# Patient Record
Sex: FEMALE | Race: WHITE | NOT HISPANIC OR LATINO | URBAN - METROPOLITAN AREA
[De-identification: names, ages, dates, MRNs, and addresses within clinical notes are randomized per-mention and may not be internally consistent; named-entity substitution may affect disease eponyms.]

---

## 2020-03-27 ENCOUNTER — EMERGENCY (EMERGENCY)
Facility: HOSPITAL | Age: 45
LOS: 1 days | Discharge: HOME | End: 2020-03-27
Attending: EMERGENCY MEDICINE
Payer: COMMERCIAL

## 2020-03-27 VITALS
HEART RATE: 92 BPM | TEMPERATURE: 98 F | DIASTOLIC BLOOD PRESSURE: 72 MMHG | SYSTOLIC BLOOD PRESSURE: 124 MMHG | OXYGEN SATURATION: 100 % | RESPIRATION RATE: 16 BRPM

## 2020-03-27 VITALS
HEART RATE: 114 BPM | OXYGEN SATURATION: 100 % | RESPIRATION RATE: 20 BRPM | SYSTOLIC BLOOD PRESSURE: 171 MMHG | TEMPERATURE: 99 F | DIASTOLIC BLOOD PRESSURE: 86 MMHG | WEIGHT: 130.07 LBS | HEIGHT: 59.5 IN

## 2020-03-27 PROCEDURE — 71045 X-RAY EXAM CHEST 1 VIEW: CPT | Mod: 26

## 2020-03-27 PROCEDURE — 99284 EMERGENCY DEPT VISIT MOD MDM: CPT

## 2020-03-27 PROCEDURE — 99284 EMERGENCY DEPT VISIT MOD MDM: CPT | Mod: 25

## 2020-03-27 PROCEDURE — 94640 AIRWAY INHALATION TREATMENT: CPT

## 2020-03-27 PROCEDURE — 71045 X-RAY EXAM CHEST 1 VIEW: CPT

## 2020-03-27 RX ORDER — ALBUTEROL 90 UG/1
1 AEROSOL, METERED ORAL ONCE
Refills: 0 | Status: COMPLETED | OUTPATIENT
Start: 2020-03-27 | End: 2020-03-27

## 2020-03-27 RX ADMIN — ALBUTEROL 1 PUFF(S): 90 AEROSOL, METERED ORAL at 05:06

## 2020-03-27 NOTE — ED PROVIDER NOTE - PATIENT PORTAL LINK FT
You can access the FollowMyHealth Patient Portal offered by [facility] by registering at the following website: [email or web address]. By joining [employer]’s FollowMyHealth portal, you will also be able to view your health information using other applications (apps) compatible with our system.

## 2020-03-27 NOTE — ED PROVIDER NOTE - NS ED ROS FT
GENERAL: No fever, chills  EYES: no vision changes, no discharge.   HEENT: no difficulty swallowing or speaking   CARDIAC: no chest pain/pressure, + SOB, no lower ex edema  PULMONARY: no cough, + SOB  GI: no abdominal pain, n/v/d  : no dysuria  SKIN: no rashes  NEURO: no headache, lightheadedness.   MSK: No joint pain, myalgia, weakness.

## 2020-03-27 NOTE — ED PROVIDER NOTE - PHYSICAL EXAMINATION
General: Patient awake alert NAD, anxious affect.   HEENT: normocephalic, atraumatic, EOMI.    Cardiac: RRR, S1, S2, no murmur.   LUNGS: clear to auscultation b/l, no wheeze, rhonchi, speaking full sentences.     Abdomen: soft NT, ND, no rebound no guarding.   EXT: Moving all extremities, no edema.   Neuro: A&Ox3, no focal neurological deficits, CN 2-12 grossly intact  Skin: warm, dry, no rash.

## 2020-03-27 NOTE — ED ADULT NURSE NOTE - OBJECTIVE STATEMENT
45 year old female with a PMH of asthma comes to the ED c/o SOB x1day. Patient endorses taking albuterol inhaler with full relief. Patient denies fever/chills at home. Patient is a/ox3, VSS, ambulatory, speaking coherently, follows commands and in NAD at this time. Lung sounds are clear and equal b/l with no labored/respiratory distress noted. Patient denies CP/SOB, f/c, n/v/d, dizziness/lightheadedness, numbness/tingling/weakness, abdominal pain/back pain at this time.

## 2020-03-27 NOTE — ED PROVIDER NOTE - ATTENDING CONTRIBUTION TO CARE
Patient with asthma presenting complaining of one day of shortness of breath and dry cough.  Trying home asthma meds.  On exam lungs clear satting 100%.  Possible COVID-19.  CXR unremarkable.  Will discharge with return precautions.

## 2020-03-27 NOTE — ED PROVIDER NOTE - OBJECTIVE STATEMENT
44 yo F pmhs of asthma, pw SOB 1 day. symptoms better with albuterol inhaler. Pt afebrile at home. Pt denies CP, GI, , neuro symptoms. No other complaints. No recent travel, no contact with known covid patients.  Pt 100% on RA in the Ed.

## 2020-03-27 NOTE — ED ADULT TRIAGE NOTE - CHIEF COMPLAINT QUOTE
diff breathing  denies fevers  +COVID exposure diff breathing  denies fevers  +COVID exposure; pt was tested at urgent care today

## 2020-03-27 NOTE — ED PROVIDER NOTE - NSFOLLOWUPINSTRUCTIONS_ED_ALL_ED_FT
You were seen in the Emergency Department (ED) for difficulty breathing. Your oxygen saturation was 100% on room air.     Please follow up with your primary care doctor in the next 72 hours.    Please return to the ED if you experience any new or concerning symptoms, such as: chest pain, difficulty breathing, passing out, unable to eat or drink, fever, chills.     Thank you for visiting a [facility] ED.

## 2020-03-27 NOTE — ED PROVIDER NOTE - PSH
Back surgery 1988  Camejo marquis for scoliosis 1988  Benign Nevus of Skin (ICD9 216.9)    cholecystectomy 07/06/04

## 2020-03-27 NOTE — ED ADULT NURSE NOTE - NSIMPLEMENTINTERV_GEN_ALL_ED
Implemented All Universal Safety Interventions:  [place] to call system. Call bell, personal items and telephone within reach. Instruct patient to call for assistance. Room bathroom lighting operational. Non-slip footwear when patient is off stretcher. Physically safe environment: no spills, clutter or unnecessary equipment. Stretcher in lowest position, wheels locked, appropriate side rails in place.

## 2020-03-27 NOTE — ED PROVIDER NOTE - CLINICAL SUMMARY MEDICAL DECISION MAKING FREE TEXT BOX
46 yo F pmhs of asthma, pw SOB 1 day. symptoms better with albuterol inhaler. Likely mild asthma exacerbation in setting of viral illness. MDI, CXR. OK home.

## 2020-03-27 NOTE — ED PROVIDER NOTE - PROGRESS NOTE DETAILS
Brittany Key M.D. Resident  Xray clear on PACS. Shared decision-making with pt, patient agreeable to going home.

## 2020-03-27 NOTE — ED PROVIDER NOTE - PMH
Anemia (ICD9 285.9)    Asthma (ICD9 493.90)  Last asthma episode 10/04/09  Celiac Disease (ICD9 579.0)    PFO (patent foramen ovale)    Scoliosis (ICD9 737.30)    TIA (Transient Ischemic Attack) (ICD9 435.9)    Vitamin D Deficiency (ICD9 268.9)

## 2020-04-03 ENCOUNTER — EMERGENCY (EMERGENCY)
Facility: HOSPITAL | Age: 45
LOS: 1 days | Discharge: HOME | End: 2020-04-03
Attending: EMERGENCY MEDICINE
Payer: COMMERCIAL

## 2020-04-03 VITALS
TEMPERATURE: 99 F | WEIGHT: 130.07 LBS | RESPIRATION RATE: 20 BRPM | SYSTOLIC BLOOD PRESSURE: 136 MMHG | DIASTOLIC BLOOD PRESSURE: 78 MMHG | OXYGEN SATURATION: 99 % | HEIGHT: 59.5 IN | HEART RATE: 106 BPM

## 2020-04-03 VITALS
SYSTOLIC BLOOD PRESSURE: 139 MMHG | RESPIRATION RATE: 20 BRPM | TEMPERATURE: 99 F | DIASTOLIC BLOOD PRESSURE: 77 MMHG | HEART RATE: 94 BPM | OXYGEN SATURATION: 100 %

## 2020-04-03 LAB
ALBUMIN SERPL ELPH-MCNC: 4.3 G/DL — SIGNIFICANT CHANGE UP (ref 3.3–5)
ALP SERPL-CCNC: 95 U/L — SIGNIFICANT CHANGE UP (ref 40–120)
ALT FLD-CCNC: 69 U/L — HIGH (ref 10–45)
ANION GAP SERPL CALC-SCNC: 14 MMOL/L — SIGNIFICANT CHANGE UP (ref 5–17)
APTT BLD: 37.4 SEC — HIGH (ref 27.5–36.3)
AST SERPL-CCNC: 49 U/L — HIGH (ref 10–40)
BASOPHILS # BLD AUTO: 0.05 K/UL — SIGNIFICANT CHANGE UP (ref 0–0.2)
BASOPHILS NFR BLD AUTO: 0.5 % — SIGNIFICANT CHANGE UP (ref 0–2)
BILIRUB SERPL-MCNC: 0.2 MG/DL — SIGNIFICANT CHANGE UP (ref 0.2–1.2)
BUN SERPL-MCNC: 8 MG/DL — SIGNIFICANT CHANGE UP (ref 7–23)
CALCIUM SERPL-MCNC: 9.5 MG/DL — SIGNIFICANT CHANGE UP (ref 8.4–10.5)
CHLORIDE SERPL-SCNC: 100 MMOL/L — SIGNIFICANT CHANGE UP (ref 96–108)
CO2 SERPL-SCNC: 22 MMOL/L — SIGNIFICANT CHANGE UP (ref 22–31)
CREAT SERPL-MCNC: 0.45 MG/DL — LOW (ref 0.5–1.3)
EOSINOPHIL # BLD AUTO: 0.04 K/UL — SIGNIFICANT CHANGE UP (ref 0–0.5)
EOSINOPHIL NFR BLD AUTO: 0.4 % — SIGNIFICANT CHANGE UP (ref 0–6)
GLUCOSE SERPL-MCNC: 104 MG/DL — HIGH (ref 70–99)
HCT VFR BLD CALC: 39.5 % — SIGNIFICANT CHANGE UP (ref 34.5–45)
HGB BLD-MCNC: 13.7 G/DL — SIGNIFICANT CHANGE UP (ref 11.5–15.5)
IMM GRANULOCYTES NFR BLD AUTO: 0.2 % — SIGNIFICANT CHANGE UP (ref 0–1.5)
INR BLD: 1.26 RATIO — HIGH (ref 0.88–1.16)
LYMPHOCYTES # BLD AUTO: 1.61 K/UL — SIGNIFICANT CHANGE UP (ref 1–3.3)
LYMPHOCYTES # BLD AUTO: 17.2 % — SIGNIFICANT CHANGE UP (ref 13–44)
MCHC RBC-ENTMCNC: 30.5 PG — SIGNIFICANT CHANGE UP (ref 27–34)
MCHC RBC-ENTMCNC: 34.7 GM/DL — SIGNIFICANT CHANGE UP (ref 32–36)
MCV RBC AUTO: 88 FL — SIGNIFICANT CHANGE UP (ref 80–100)
MONOCYTES # BLD AUTO: 0.61 K/UL — SIGNIFICANT CHANGE UP (ref 0–0.9)
MONOCYTES NFR BLD AUTO: 6.5 % — SIGNIFICANT CHANGE UP (ref 2–14)
NEUTROPHILS # BLD AUTO: 7.02 K/UL — SIGNIFICANT CHANGE UP (ref 1.8–7.4)
NEUTROPHILS NFR BLD AUTO: 75.2 % — SIGNIFICANT CHANGE UP (ref 43–77)
NRBC # BLD: 0 /100 WBCS — SIGNIFICANT CHANGE UP (ref 0–0)
PLATELET # BLD AUTO: 411 K/UL — HIGH (ref 150–400)
POTASSIUM SERPL-MCNC: 4.1 MMOL/L — SIGNIFICANT CHANGE UP (ref 3.5–5.3)
POTASSIUM SERPL-SCNC: 4.1 MMOL/L — SIGNIFICANT CHANGE UP (ref 3.5–5.3)
PROT SERPL-MCNC: 8.4 G/DL — HIGH (ref 6–8.3)
PROTHROM AB SERPL-ACNC: 14.5 SEC — HIGH (ref 10–12.9)
RBC # BLD: 4.49 M/UL — SIGNIFICANT CHANGE UP (ref 3.8–5.2)
RBC # FLD: 12.2 % — SIGNIFICANT CHANGE UP (ref 10.3–14.5)
SODIUM SERPL-SCNC: 136 MMOL/L — SIGNIFICANT CHANGE UP (ref 135–145)
WBC # BLD: 9.35 K/UL — SIGNIFICANT CHANGE UP (ref 3.8–10.5)
WBC # FLD AUTO: 9.35 K/UL — SIGNIFICANT CHANGE UP (ref 3.8–10.5)

## 2020-04-03 PROCEDURE — 85027 COMPLETE CBC AUTOMATED: CPT

## 2020-04-03 PROCEDURE — 99284 EMERGENCY DEPT VISIT MOD MDM: CPT | Mod: 25

## 2020-04-03 PROCEDURE — 70498 CT ANGIOGRAPHY NECK: CPT

## 2020-04-03 PROCEDURE — 99285 EMERGENCY DEPT VISIT HI MDM: CPT

## 2020-04-03 PROCEDURE — 93005 ELECTROCARDIOGRAM TRACING: CPT

## 2020-04-03 PROCEDURE — 93010 ELECTROCARDIOGRAM REPORT: CPT

## 2020-04-03 PROCEDURE — 70450 CT HEAD/BRAIN W/O DYE: CPT

## 2020-04-03 PROCEDURE — 70496 CT ANGIOGRAPHY HEAD: CPT

## 2020-04-03 PROCEDURE — 71045 X-RAY EXAM CHEST 1 VIEW: CPT | Mod: 26

## 2020-04-03 PROCEDURE — 70496 CT ANGIOGRAPHY HEAD: CPT | Mod: 26

## 2020-04-03 PROCEDURE — 71045 X-RAY EXAM CHEST 1 VIEW: CPT

## 2020-04-03 PROCEDURE — 70498 CT ANGIOGRAPHY NECK: CPT | Mod: 26

## 2020-04-03 PROCEDURE — 85610 PROTHROMBIN TIME: CPT

## 2020-04-03 PROCEDURE — 85730 THROMBOPLASTIN TIME PARTIAL: CPT

## 2020-04-03 PROCEDURE — 80053 COMPREHEN METABOLIC PANEL: CPT

## 2020-04-03 PROCEDURE — 70450 CT HEAD/BRAIN W/O DYE: CPT | Mod: 26,59

## 2020-04-03 PROCEDURE — 82962 GLUCOSE BLOOD TEST: CPT

## 2020-04-03 RX ORDER — CLOPIDOGREL BISULFATE 75 MG/1
1 TABLET, FILM COATED ORAL
Qty: 30 | Refills: 0
Start: 2020-04-03 | End: 2020-05-02

## 2020-04-03 NOTE — ED PROVIDER NOTE - CLINICAL SUMMARY MEDICAL DECISION MAKING FREE TEXT BOX
45y F hx TIA, PFO, coagulopathy, asthma supposed to be on Plavix which she has not been taking here today with c/o L sided numbness onset 2AM. No focal exam findings. Will obtain CT head. D/w neuro. Jarrett LOTT home w oral AC given no deficits and OP FU. Regarding SOB, pt is satting 100%, no distress, lungs CTA BL, no fevers. Will check EKG, CXR, given SOB only occurs at night, possibly FER vs anxiety driven, Can give OP pulm FU.

## 2020-04-03 NOTE — ED PROVIDER NOTE - PATIENT PORTAL LINK FT
You can access the FollowMyHealth Patient Portal offered by [facility] by registering at the following website: [email or web address]. By joining [employer]’s FollowMyHealth portal, you will also be able to view your health information using other applications (apps) compatible with our system.

## 2020-04-03 NOTE — ED ADULT NURSE NOTE - OBJECTIVE STATEMENT
pt developed left sided weakness and is out of the window for tpa.  she can walk and has no neuro deficits.she has weakness in her left arm to her leg  pulses and refill are adequate pt developed left sided weakness and is out of the window for tpa.  she can walk and has no neuro deficits.she has weakness in her left arm to her leg  pulses and refill are adequate  pt has a history of TIA

## 2020-04-03 NOTE — ED PROVIDER NOTE - CARE PROVIDER_API CALL
Jose Mueller (DO)  Neurology; Vascular Neurology  [address]  [place] [address]  Phone: [phone]  Fax: [phone]  Follow Up Time: 1-3 Days

## 2020-04-03 NOTE — ED PROVIDER NOTE - PROGRESS NOTE DETAILS
Madhu, PGY1 - Spoke to neuro. Unclear what's going on with pt (small stroke vs. dymelinatiny lesion vs. migraine). Unable to perform MRI, so recommend c/w Plavix 75 qd and CTA head/neck. F/u with Dr. Salvador Oneal outpatient. Madhu, PGY1 - CTA normal. As per neuro recs, pt TBDC with Plavix and f/u with Dr. Mueller as outpatient. Discussed results and dispo with pt's father Dr. Wright (neurologist, [phone]). Madhu, PGY1 - CTA normal. As per neuro recs, pt TBDC with Plavix and f/u with Dr. Mueller as outpatient. Discussed results and dispo with pt's father Dr. Wright (neurologist, [phone]).    Results were discussed with patient as well as return precautions and follow up plan with PCP and/or specialist. Time was taken to answer any questions that the patient had before providing them with discharge paperwork.

## 2020-04-03 NOTE — ED PROVIDER NOTE - OBJECTIVE STATEMENT
45y F hx TIA, PFO, coagulopathy, asthma supposed to be on Plavix which she has not been taking here today with c/o L sided numbness. Pt states onset of sx at 2AM today. Was lying in bed when developed L sided neck pain which has since resolved, no fall/trauma or hx of disc herniation. Subsequently developed LUE and LLE subjective weakness. No slurred speech, no trouble ambulating. Took one dose of her mom's Xarelto this AM. Pt states had covid exposure and was tested 1 week ago and has been self -isolating since then. Received word that her test was negative today. States she has been feeling extremely anxious, endorses episodic SOB which occurs in the evening. Not presently sob. ALso endorsing tachycardia after taking her albuterol/ipatropium inhaler. 45y F hx TIA, PFO, coagulopathy, asthma supposed to be on Plavix which she has not been taking here today with c/o L sided numbness. Pt states onset of sx at 2AM today. Was lying in bed when developed L sided neck pain which has since resolved, no fall/trauma or hx of disc herniation. No spinal injections. Subsequently developed LUE and LLE subjective weakness. No slurred speech, no trouble ambulating. Took one dose of her mom's Xarelto this AM. Pt states had covid exposure and was tested 1 week ago and has been self -isolating since then. Received word that her test was negative today. States she has been feeling extremely anxious, endorses episodic SOB which occurs in the evening. Not presently sob. ALso endorsing tachycardia after taking her albuterol/ipatropium inhaler.

## 2020-04-03 NOTE — ED ADULT TRIAGE NOTE - CHIEF COMPLAINT QUOTE
left arm and left leg weakness left arm and left leg weakness since 2a, no facial droop or slurred speech, equal  bilaterally and strong

## 2020-04-03 NOTE — CONSULT NOTE ADULT - SUBJECTIVE AND OBJECTIVE BOX
NEUROLOGY CONSULT   HPI: 45y R-handed F with h/o self-reported TIA? (2008 L. hemiparesis lasting weeks), PFO, heterozygous prothrombin gene mutation, abnormal protein C response, central cervical disc protrusion C4/C5 and scoliosis s/p Camejo marquis placement p/w subjective left sided sensorimotor deficit of arm and leg.      ROS: A 10-system ROS was performed and is negative except for those items noted above.     PMH:  PFO (patent foramen ovale)  Scoliosis (ICD9 737.30)  TIA (Transient Ischemic Attack) (ICD9 435.9)  Vitamin D Deficiency (ICD9 268.9)  Celiac Disease (ICD9 579.0)  Anemia (ICD9 285.9)  Asthma (ICD9 493.90)    Home Medications:  Singulair:  orally  (10 Jul 2016 23:51)    ALLERGIES: Advil (Anaphylaxis)  aspirin (Hypotension)  codeine (Anaphylaxis)  opioid-like analgesics (Anaphylaxis)    PSH:   Benign Nevus of Skin (ICD9 216.9)  Back surgery 1988  cholecystectomy 07/06/04    Social History: Denies tobacco, alcohol or drug use.     FH:  Lupus  Stroke in cousin      OBJECTIVE  Vital Signs Last 24 Hrs  T(C): 36.7 (03 Apr 2020 15:47), Max: 37.3 (03 Apr 2020 14:06)  T(F): 98 (03 Apr 2020 15:47), Max: 99.2 (03 Apr 2020 14:06)  HR: 92 (03 Apr 2020 18:00) (91 - 106)  BP: 132/85 (03 Apr 2020 18:00) (132/71 - 138/80)  BP(mean): --  RR: 20 (03 Apr 2020 18:00) (20 - 20)  SpO2: 99% (03 Apr 2020 18:00) (99% - 99%)  I&O's Summary    PHYSICAL EXAM:  General: Appears stated age, in NAD  Neurologic:  Mental Status: Awake, alert, oriented to person, place, situation, time. Normal affect. Follows all commands.  Language: Speech is clear, fluent with preserved naming, repetition and comprehension.    Cranial Nerves: PERRLA (R = 3mm, L = 3mm). Visual fields intact. EOMI no nystagmus. V1-3 intact to light touch.  No facial asymmetry b/l, full eye closure strength b/l. Hearing grossly normal to conversation.  Symmetric palate elevation in midline.  Head turning & shoulder shrug intact b/l. Tongue midline, normal movements, no atrophy.  Motor: Normal muscle bulk & tone. No noticeable tremor, myoclonus or pronator drift. 5/5 strength throughout.	  Sensation: Symmetric B/L preserved sensation to light touch, pin prick, position.    Cortical: No extinction on double simultaneous touch and no signs of neglect.   Reflexes: 3/4 throughout B/L UE and LE.  Plantar Responses are downgoing B/L    Coordination: Intact rapid-alternating movements. No dysmetria on finger-to-nose or heel-to-shin.  No dysdiadochokinesia  Gait: Normal stance, stride length, touch off, arm swing and may.   Normal Romberg. No postural instability.   Psychiatric: Normal mood and congruent affect.     CBC:                        13.7   9.35  )-----------( 411      ( 03 Apr 2020 16:15 )             39.5       CMP:  04-03    136  |  100  |  8   ----------------------------<  104<H>  4.1   |  22  |  0.45<L>    Ca    9.5      03 Apr 2020 16:15    TPro  8.4<H>  /  Alb  4.3  /  TBili  0.2  /  DBili  x   /  AST  49<H>  /  ALT  69<H>  /  AlkPhos  95  04-03    LIVER FUNCTIONS - ( 03 Apr 2020 16:15 )  Alb: 4.3 g/dL / Pro: 8.4 g/dL / ALK PHOS: 95 U/L / ALT: 69 U/L / AST: 49 U/L / GGT: x           PT/INR - ( 03 Apr 2020 16:15 )   PT: 14.5 sec;   INR: 1.26 ratio      CT Head No Cont: 04/03/2020    IMPRESSION: No acute intracranial findings. NEUROLOGY CONSULT   HPI: 45y R-handed F with h/o self-reported TIA? (2008 L. hemiparesis lasting weeks), PFO, heterozygous prothrombin gene mutation, abnormal protein C response, central cervical disc protrusion C4/C5 and scoliosis s/p Camejo marquis placement p/w subjective left sided sensorimotor deficit of arm and leg.  LKN 04/02/2020 16:00.  Began to experience L. hand and foot numbness which she attributed to poor circulation.  Woke up at 02:00 am with L. neck pain and numbness persistent.  The numbness improved but then turned into left sided weakness.  She notes this has also improved but reports she still feels her leg is heavy.  NIHSS: 0.     She reports same episode in 2008 though at that time it involved the lower portion of her left cheek.  Prior work up included CT head, CTA H&N which did not definitively show ischemic infarct.  She additionally had a TTE which showed + PFO and hypercoagulable work up which was positive for protein C abnormality, heterozygous prothrombin gene mutation but negative for factor 5 Leiden  She was told to continue Plavix indefinitely but stopped on her own accord as she felt normal.  Her left sided symptoms had improved over several weeks.  Of note, prior outpatient records note a R. hemiparesis.  She has no residual deficits from prior episode.  She cannot take aspirin as she becomes hypotensive from ASA?  Additionally, cannot get MRI as she has MRI-incompatible Camejo marquis for scoliosis.  Denies any trauma, headache though in past endorses scotomas in her visual fields a/w headache.    ROS: A 10-system ROS was performed and is negative except for those items noted above.     PMH:  PFO (patent foramen ovale)  Scoliosis (ICD9 737.30)  TIA (Transient Ischemic Attack) (ICD9 435.9)  Vitamin D Deficiency (ICD9 268.9)  Celiac Disease (ICD9 579.0)  Anemia (ICD9 285.9)  Asthma (ICD9 493.90)    Home Medications:  Singulair:  orally  (10 Jul 2016 23:51)    ALLERGIES: Advil (Anaphylaxis)  aspirin (Hypotension)  codeine (Anaphylaxis)  opioid-like analgesics (Anaphylaxis)    PSH:   Benign Nevus of Skin (ICD9 216.9)  Back surgery 1988  cholecystectomy 07/06/04    Social History: Denies tobacco, alcohol or drug use.     FH:  Lupus  Stroke in cousin      OBJECTIVE  Vital Signs Last 24 Hrs  T(C): 36.7 (03 Apr 2020 15:47), Max: 37.3 (03 Apr 2020 14:06)  T(F): 98 (03 Apr 2020 15:47), Max: 99.2 (03 Apr 2020 14:06)  HR: 92 (03 Apr 2020 18:00) (91 - 106)  BP: 132/85 (03 Apr 2020 18:00) (132/71 - 138/80)  BP(mean): --  RR: 20 (03 Apr 2020 18:00) (20 - 20)  SpO2: 99% (03 Apr 2020 18:00) (99% - 99%)  I&O's Summary    PHYSICAL EXAM:  General: Appears stated age, in NAD  Neurologic:  Mental Status: Awake, alert, oriented to person, place, situation, time. Normal affect. Follows all commands.  Language: Speech is clear, fluent with preserved naming, repetition and comprehension.    Cranial Nerves: PERRLA (R = 3mm, L = 3mm). Visual fields intact. EOMI no nystagmus. V1-3 intact to light touch.  No facial asymmetry b/l, full eye closure strength b/l. Hearing grossly normal to conversation.  Symmetric palate elevation in midline.  Head turning & shoulder shrug intact b/l. Tongue midline, normal movements, no atrophy.  Motor: Normal muscle bulk & tone. No noticeable tremor, myoclonus or pronator drift. 5/5 strength throughout.	  Sensation: Symmetric B/L preserved sensation to light touch, pin prick, position.    Cortical: No extinction on double simultaneous touch and no signs of neglect.   Reflexes: 3/4 throughout B/L UE and LE.  Plantar Responses are downgoing B/L    Coordination: Intact rapid-alternating movements. No dysmetria on finger-to-nose or heel-to-shin.  No dysdiadochokinesia  Gait: Normal stance, stride length, touch off, arm swing and may.   Normal Romberg. No postural instability.   Psychiatric: Normal mood and congruent affect.     CBC:                        13.7   9.35  )-----------( 411      ( 03 Apr 2020 16:15 )             39.5       CMP:  04-03    136  |  100  |  8   ----------------------------<  104<H>  4.1   |  22  |  0.45<L>    Ca    9.5      03 Apr 2020 16:15    TPro  8.4<H>  /  Alb  4.3  /  TBili  0.2  /  DBili  x   /  AST  49<H>  /  ALT  69<H>  /  AlkPhos  95  04-03  PT/INR - ( 03 Apr 2020 16:15 )   PT: 14.5 sec;   INR: 1.26 ratio      CT Head No Cont: 04/03/2020    IMPRESSION: No acute intracranial findings.    CT Angio Neck w/ IV Cont (04.03.20 @ 19:07)   IMPRESSION:  No evidence for significant intracranial or extracranial arterial stenosis. If the patient remains symptomatic, consider brain MRI imaging follow-up.

## 2020-04-03 NOTE — CONSULT NOTE ADULT - ASSESSMENT
INCOMPLETE  Assessment:  45y R-handed F with h/o self-reported TIA? (2008 L. hemiparesis lasting weeks), PFO, heterozygous prothrombin gene mutation, abnormal protein C response, central cervical disc protrusion C4/C5 and scoliosis s/p harington marquis placement p/w subjective left sided sensorimotor deficit of arm and leg.      LKN: 16:00 04/02/2020  NIHSS: 0  Baseline MRS: 0  Not a tPA or thrombectomy candidate due to NIHSS 0  On exam, she has no objective neurologic deficit.     Prior TIA extensive work up including hypercoagulable evaluation, TTE with bubble, CTH, CTA.  Cannot get MRI due to non-compatible Harington marquis.  PT cannot take aspirin due to hypotensive adverse reaction?  Was intended to take Plavix indefinitely after prior episode, but self discontinued as she felt fine.     IMPRESSION: S/O L sensory-motor deficit, objectively improved, possibly due to R. brain dysfunction of unknown etiology but possibly ischemic vs. demyelinating vs. acephalgic complex migraine.     Plan  -Restart Plavix 75 daily, continue indefinitely and encourage adherence   -CTA H&N  -If normal, outpatient work up including repeat CT head w/o contrast, TTE, lipid panel, A1C  -Outpatient F/U with Dr. Mueller   -If LDL abnormal, atorvastatin 80 with adjustment to goal LDL <70    Assessment and plan discussed with the attending, Dr. Libman David Klein, DO  PGY-2 Neurology Service Assessment:  45y R-handed F with h/o self-reported TIA? (2008 L. hemiparesis lasting weeks), PFO, heterozygous prothrombin gene mutation, abnormal protein C response, central cervical disc protrusion C4/C5 and scoliosis s/p harington marquis placement p/w subjective left sided sensorimotor deficit of arm and leg.      LKN: 16:00 04/02/2020  NIHSS: 0  Baseline MRS: 0  Not a tPA or thrombectomy candidate due to NIHSS 0  On exam, she has no objective neurologic deficit.     Prior TIA extensive work up including hypercoagulable evaluation, TTE with bubble, CTH, CTA.  Cannot get MRI due to non-compatible Harington marquis.  PT cannot take aspirin due to hypotensive adverse reaction?  Was intended to take Plavix indefinitely after prior episode, but self discontinued as she felt fine.  CT head and CTA H&N 04/03 unremarkable.     IMPRESSION: S/O L sensory-motor deficit, objectively improved, possibly due to R. brain dysfunction of unknown etiology but possibly ischemic vs. demyelinating vs. acephalgic complex migraine.     Plan  -Restart Plavix 75 daily, continue indefinitely and encourage adherence   -If normal, outpatient work up including repeat CT head w/o contrast, TTE, lipid panel, A1C  -Outpatient F/U with Dr. Mueller   -If LDL abnormal, atorvastatin 80 with adjustment to goal LDL <70    Assessment and plan discussed with the attending, Dr. Libman David Klein, DO  PGY-2 Neurology Service

## 2020-04-03 NOTE — ED PROVIDER NOTE - NSFOLLOWUPINSTRUCTIONS_ED_ALL_ED_FT
A prescription for Plavix was sent to your pharmacy. Take as directed on medication insert.     Follow up with Dr. Mueller (neurology) this week.    Rest, drink plenty of fluids.  Advance activity as tolerated.  Continue all previously prescribed medications as directed.  Follow up with your primary care physician in 48-72 hours- bring copies of your results.  Return to the ER for worsening or persistent symptoms, and/or ANY NEW OR CONCERNING SYMPTOMS. If you have issues obtaining follow up, please call: [phone] to obtain a doctor or specialist who takes your insurance in your area.

## 2020-04-03 NOTE — ED PROVIDER NOTE - PHYSICAL EXAMINATION
Gen: WNWD NAD  HEENT: NCAT PERRL EOMI normal pharynx  Neck: supple, no midline ttp   CV: RRR, no murmur  Lung: CTA BL  Abd: +BS soft NTND  Ext: wwp, palp pulses, FROMx4, no cce  Neuro: A&Ox3, CN grossly intact, sensation intact, motor 5/5 throughout, no drift, ambulatory

## 2020-04-03 NOTE — ED ADULT NURSE NOTE - CHIEF COMPLAINT QUOTE
left arm and left leg weakness since 2a, no facial droop or slurred speech, equal  bilaterally and strong

## 2020-04-20 ENCOUNTER — EMERGENCY (EMERGENCY)
Facility: HOSPITAL | Age: 45
LOS: 1 days | Discharge: HOME | End: 2020-04-20
Attending: EMERGENCY MEDICINE
Payer: COMMERCIAL

## 2020-04-20 VITALS
HEART RATE: 110 BPM | OXYGEN SATURATION: 99 % | DIASTOLIC BLOOD PRESSURE: 90 MMHG | SYSTOLIC BLOOD PRESSURE: 139 MMHG | TEMPERATURE: 98 F | WEIGHT: 130.07 LBS | RESPIRATION RATE: 17 BRPM | HEIGHT: 59 IN

## 2020-04-20 VITALS
DIASTOLIC BLOOD PRESSURE: 92 MMHG | SYSTOLIC BLOOD PRESSURE: 146 MMHG | HEART RATE: 94 BPM | TEMPERATURE: 98 F | OXYGEN SATURATION: 100 % | RESPIRATION RATE: 16 BRPM

## 2020-04-20 LAB
ALBUMIN SERPL ELPH-MCNC: 4.4 G/DL — SIGNIFICANT CHANGE UP (ref 3.3–5)
ALP SERPL-CCNC: 82 U/L — SIGNIFICANT CHANGE UP (ref 40–120)
ALT FLD-CCNC: 31 U/L — SIGNIFICANT CHANGE UP (ref 10–45)
ANION GAP SERPL CALC-SCNC: 14 MMOL/L — SIGNIFICANT CHANGE UP (ref 5–17)
APTT BLD: 32.6 SEC — SIGNIFICANT CHANGE UP (ref 27.5–36.3)
AST SERPL-CCNC: 25 U/L — SIGNIFICANT CHANGE UP (ref 10–40)
BASOPHILS # BLD AUTO: 0.03 K/UL — SIGNIFICANT CHANGE UP (ref 0–0.2)
BASOPHILS NFR BLD AUTO: 0.3 % — SIGNIFICANT CHANGE UP (ref 0–2)
BILIRUB SERPL-MCNC: 0.3 MG/DL — SIGNIFICANT CHANGE UP (ref 0.2–1.2)
BUN SERPL-MCNC: 8 MG/DL — SIGNIFICANT CHANGE UP (ref 7–23)
CALCIUM SERPL-MCNC: 10.1 MG/DL — SIGNIFICANT CHANGE UP (ref 8.4–10.5)
CHLORIDE SERPL-SCNC: 102 MMOL/L — SIGNIFICANT CHANGE UP (ref 96–108)
CO2 SERPL-SCNC: 22 MMOL/L — SIGNIFICANT CHANGE UP (ref 22–31)
CREAT SERPL-MCNC: 0.61 MG/DL — SIGNIFICANT CHANGE UP (ref 0.5–1.3)
CRP SERPL-MCNC: 0.91 MG/DL — HIGH (ref 0–0.4)
D DIMER BLD IA.RAPID-MCNC: <150 NG/ML DDU — SIGNIFICANT CHANGE UP
EOSINOPHIL # BLD AUTO: 0.04 K/UL — SIGNIFICANT CHANGE UP (ref 0–0.5)
EOSINOPHIL NFR BLD AUTO: 0.4 % — SIGNIFICANT CHANGE UP (ref 0–6)
FERRITIN SERPL-MCNC: 92 NG/ML — SIGNIFICANT CHANGE UP (ref 15–150)
GLUCOSE SERPL-MCNC: 98 MG/DL — SIGNIFICANT CHANGE UP (ref 70–99)
HCT VFR BLD CALC: 38.9 % — SIGNIFICANT CHANGE UP (ref 34.5–45)
HGB BLD-MCNC: 13.1 G/DL — SIGNIFICANT CHANGE UP (ref 11.5–15.5)
IMM GRANULOCYTES NFR BLD AUTO: 0.3 % — SIGNIFICANT CHANGE UP (ref 0–1.5)
INR BLD: 1.05 RATIO — SIGNIFICANT CHANGE UP (ref 0.88–1.16)
LDH SERPL L TO P-CCNC: 144 U/L — SIGNIFICANT CHANGE UP (ref 50–242)
LYMPHOCYTES # BLD AUTO: 1.4 K/UL — SIGNIFICANT CHANGE UP (ref 1–3.3)
LYMPHOCYTES # BLD AUTO: 15.3 % — SIGNIFICANT CHANGE UP (ref 13–44)
MCHC RBC-ENTMCNC: 30 PG — SIGNIFICANT CHANGE UP (ref 27–34)
MCHC RBC-ENTMCNC: 33.7 GM/DL — SIGNIFICANT CHANGE UP (ref 32–36)
MCV RBC AUTO: 89.2 FL — SIGNIFICANT CHANGE UP (ref 80–100)
MONOCYTES # BLD AUTO: 0.51 K/UL — SIGNIFICANT CHANGE UP (ref 0–0.9)
MONOCYTES NFR BLD AUTO: 5.6 % — SIGNIFICANT CHANGE UP (ref 2–14)
NEUTROPHILS # BLD AUTO: 7.12 K/UL — SIGNIFICANT CHANGE UP (ref 1.8–7.4)
NEUTROPHILS NFR BLD AUTO: 78.1 % — HIGH (ref 43–77)
NRBC # BLD: 0 /100 WBCS — SIGNIFICANT CHANGE UP (ref 0–0)
PLATELET # BLD AUTO: 382 K/UL — SIGNIFICANT CHANGE UP (ref 150–400)
POTASSIUM SERPL-MCNC: 3.7 MMOL/L — SIGNIFICANT CHANGE UP (ref 3.5–5.3)
POTASSIUM SERPL-SCNC: 3.7 MMOL/L — SIGNIFICANT CHANGE UP (ref 3.5–5.3)
PROCALCITONIN SERPL-MCNC: 0.02 NG/ML — SIGNIFICANT CHANGE UP (ref 0.02–0.1)
PROT SERPL-MCNC: 8.2 G/DL — SIGNIFICANT CHANGE UP (ref 6–8.3)
PROTHROM AB SERPL-ACNC: 12.1 SEC — SIGNIFICANT CHANGE UP (ref 10–12.9)
RBC # BLD: 4.36 M/UL — SIGNIFICANT CHANGE UP (ref 3.8–5.2)
RBC # FLD: 12.6 % — SIGNIFICANT CHANGE UP (ref 10.3–14.5)
SARS-COV-2 RNA SPEC QL NAA+PROBE: SIGNIFICANT CHANGE UP
SODIUM SERPL-SCNC: 138 MMOL/L — SIGNIFICANT CHANGE UP (ref 135–145)
TROPONIN T, HIGH SENSITIVITY RESULT: 11 NG/L — SIGNIFICANT CHANGE UP (ref 0–51)
WBC # BLD: 9.13 K/UL — SIGNIFICANT CHANGE UP (ref 3.8–10.5)
WBC # FLD AUTO: 9.13 K/UL — SIGNIFICANT CHANGE UP (ref 3.8–10.5)

## 2020-04-20 PROCEDURE — 85730 THROMBOPLASTIN TIME PARTIAL: CPT

## 2020-04-20 PROCEDURE — 71275 CT ANGIOGRAPHY CHEST: CPT

## 2020-04-20 PROCEDURE — 36415 COLL VENOUS BLD VENIPUNCTURE: CPT

## 2020-04-20 PROCEDURE — 83615 LACTATE (LD) (LDH) ENZYME: CPT

## 2020-04-20 PROCEDURE — 71045 X-RAY EXAM CHEST 1 VIEW: CPT

## 2020-04-20 PROCEDURE — 85027 COMPLETE CBC AUTOMATED: CPT

## 2020-04-20 PROCEDURE — 84484 ASSAY OF TROPONIN QUANT: CPT

## 2020-04-20 PROCEDURE — 71045 X-RAY EXAM CHEST 1 VIEW: CPT | Mod: 26

## 2020-04-20 PROCEDURE — 99285 EMERGENCY DEPT VISIT HI MDM: CPT | Mod: CR

## 2020-04-20 PROCEDURE — 80053 COMPREHEN METABOLIC PANEL: CPT

## 2020-04-20 PROCEDURE — 99284 EMERGENCY DEPT VISIT MOD MDM: CPT | Mod: 25

## 2020-04-20 PROCEDURE — 86140 C-REACTIVE PROTEIN: CPT

## 2020-04-20 PROCEDURE — 85379 FIBRIN DEGRADATION QUANT: CPT

## 2020-04-20 PROCEDURE — 71275 CT ANGIOGRAPHY CHEST: CPT | Mod: 26

## 2020-04-20 PROCEDURE — 83880 ASSAY OF NATRIURETIC PEPTIDE: CPT

## 2020-04-20 PROCEDURE — 82728 ASSAY OF FERRITIN: CPT

## 2020-04-20 PROCEDURE — 87635 SARS-COV-2 COVID-19 AMP PRB: CPT

## 2020-04-20 PROCEDURE — 84145 PROCALCITONIN (PCT): CPT

## 2020-04-20 PROCEDURE — 85610 PROTHROMBIN TIME: CPT

## 2020-04-20 PROCEDURE — 93010 ELECTROCARDIOGRAM REPORT: CPT

## 2020-04-20 PROCEDURE — 84702 CHORIONIC GONADOTROPIN TEST: CPT

## 2020-04-20 PROCEDURE — 93005 ELECTROCARDIOGRAM TRACING: CPT

## 2020-04-20 NOTE — ED PROVIDER NOTE - PATIENT PORTAL LINK FT
You can access the FollowMyHealth Patient Portal offered by [facility] by registering at the following website: [email or web address]. By joining [employer]’s FollowMyHealth portal, you will also be able to view your health information using other applications (apps) compatible with our system.

## 2020-04-20 NOTE — ED ADULT NURSE NOTE - OBJECTIVE STATEMENT
45 female  comes in with hx of asthma and TIA last week put on Plavix.  Pt symptoms were  left sided weakness no residual but Pt feels weakness time to time.  pt pox 100 % pr afebrile heartrate 100-106 and no respiratory symptoms  noted.  IVL placed and labs sent as ordered rolo hernandez

## 2020-04-20 NOTE — ED ADULT NURSE NOTE - CHPI ED NUR SYMPTOMS NEG
no shortness of breath/no vomiting/no congestion/no dizziness/no back pain/no chest pain/no chills/no nausea/no fever/no syncope/no diaphoresis

## 2020-04-20 NOTE — ED PROVIDER NOTE - NS ED ROS FT
Constitutional: no fevers, no chills.  Eyes: no visual changes.  Ears: no ear drainage, no ear pain.  Nose: no nasal congestion.  Mouth/Throat: no sore throat.  Cardiovascular: no chest pain.  Respiratory: +shortness of breath, no wheezing, no cough  Gastrointestinal: no nausea, no vomiting, + diarrhea, no abdominal pain.  MSK: no flank pain, no back pain.  Genitourinary: no dysuria, no hematuria.  Skin: no rashes.  Neuro: no headache

## 2020-04-20 NOTE — ED PROVIDER NOTE - OBJECTIVE STATEMENT
45y F hx TIA, PFO, coagulopathy, asthma supposed to be on Plavix, tested neg outpt presents with SOB and tachycardia x couple weeks unimproved by her nebulizer so sent by pulmonologist from [facility] to r/o PE. Pt also endorse some diarrhea, non bloody this AM. 100% on RA. Patient denies chest pain, f/c, cough, abd pain, N/V/C, weakness, HA, dizziness, urinary symptoms, extremity pain or swelling or other complaints.

## 2020-04-20 NOTE — ED PROVIDER NOTE - ATTENDING CONTRIBUTION TO CARE
Nemes - 44yo F hx TIA, PFO, coagulopathy, asthma supposed to be on Plavix, tested neg for Covid outpt presents with SOB and tachycardia x couple weeks unimproved by her nebulizer so sent by pulmonologist from [facility] to r/o PE. Pt also endorse some diarrhea, non bloody this AM. 100% on RA. Patient denies chest pain, f/c, cough, abd pain, N/V/C, weakness, HA, dizziness, urinary symptoms, extremity pain or swelling or other complaints. VS wnl, well appearing, in NAD, lungs clear, cardiac wnl, no JVD. Moist mucosae, pink conjunctivae. Abdomen soft/NT, no CVAT. No pedal edema, no calf TTP. Neuro grossly intact. Poss Covid vs PE. Will get labs, swab, CXR, if neg, will get CTA r/o PE

## 2020-04-20 NOTE — ED PROVIDER NOTE - PHYSICAL EXAMINATION
----- Message from Justine Lord sent at 11/29/2018  2:43 PM CST -----  Contact: Patient  Patient is returning a call, please call them back at [phone]. Thank you   GEN: Well appearing, well nourished, in no apparent distress.  HEAD: NCAT  HEENT: PERRL, Airway patent, EOMI, non-erythematous pharynx, no exudates, uvula midline, MMM, neck supple, no LAD, no JVD  LUNG: CTAB, no adventitious sounds, no retractions, no nasal flaring  CV: RRR, no murmurs,   Abd: soft, NTND, no rebound or guarding, BS+ in all quadrants, no CVAT  MSK: WWP, Pulses 2+ in extremities, No edema   Neuro:  AAOx3, Ambulatory with stable gait.  Skin: Warm and dry, no evidence of rash  Psych: normal mood and affect

## 2020-04-20 NOTE — ED PROVIDER NOTE - CLINICAL SUMMARY MEDICAL DECISION MAKING FREE TEXT BOX
45y F hx TIA, PFO, coagulopathy, asthma supposed to be on Plavix, tested neg outpt presents with SOB and tachycardia x couple weeks unimproved by her nebulizer so sent by pulmonologist from [facility] to r/o PE. Pt likely has COVID illness, get covid labs( Ferritin, D-dimer, CRP, Trop, VBG, basic labs, Coags), cxr and attempt to prone then titrate down O2 supplementation. Try albuterol and tylenol for symptom relief +/- abx. CTA to r/o PE as her CXR completely clear .

## 2020-04-20 NOTE — ED PROVIDER NOTE - PROGRESS NOTE DETAILS
Dr Hayden: labs unremarkble and CTPE negative, pt does not have current sob, ambulatory sat 98%. Pt has personal pulse ox which has been reading 08% of higher whole time she has been here

## 2020-04-20 NOTE — ED PROVIDER NOTE - NSFOLLOWUPINSTRUCTIONS_ED_ALL_ED_FT
1) You were seen in the ER for cough and fevers.      2) You might also have coronavirus so continue to isolate yourself and wear mask for 7 days until no longer have symptoms. YOU WILL BE CALLED WHEN THE RESULTS COME BACK. Call your PMD in order to clear you for work if needed.      3) Please take Tylenol 650mg every 4-6 hours as needed for pain.    4) Please return to ED for any new or worsening symptoms especially if you are having a lot of trouble breathing or can not speak in full sentence.     5) If your pulse oximeter reads less than 90% consistently, come back the ED.

## 2020-04-22 ENCOUNTER — TRANSCRIPTION ENCOUNTER (OUTPATIENT)
Age: 45
End: 2020-04-22

## 2020-05-26 ENCOUNTER — APPOINTMENT (OUTPATIENT)
Dept: CT IMAGING | Facility: CLINIC | Age: 45
End: 2020-05-26
Payer: COMMERCIAL

## 2020-05-26 ENCOUNTER — OUTPATIENT (OUTPATIENT)
Dept: OUTPATIENT SERVICES | Facility: HOSPITAL | Age: 45
LOS: 1 days | End: 2020-05-26
Payer: COMMERCIAL

## 2020-05-26 DIAGNOSIS — Z00.8 ENCOUNTER FOR OTHER GENERAL EXAMINATION: ICD-10-CM

## 2020-05-26 PROCEDURE — 70450 CT HEAD/BRAIN W/O DYE: CPT | Mod: 26

## 2020-05-26 PROCEDURE — 70450 CT HEAD/BRAIN W/O DYE: CPT

## 2021-02-27 ENCOUNTER — EMERGENCY (EMERGENCY)
Facility: HOSPITAL | Age: 46
LOS: 1 days | Discharge: HOME | End: 2021-02-27
Attending: EMERGENCY MEDICINE
Payer: COMMERCIAL

## 2021-02-27 VITALS
OXYGEN SATURATION: 100 % | HEART RATE: 78 BPM | TEMPERATURE: 98 F | RESPIRATION RATE: 16 BRPM | SYSTOLIC BLOOD PRESSURE: 132 MMHG | DIASTOLIC BLOOD PRESSURE: 72 MMHG

## 2021-02-27 VITALS
OXYGEN SATURATION: 98 % | RESPIRATION RATE: 16 BRPM | WEIGHT: 138.89 LBS | DIASTOLIC BLOOD PRESSURE: 72 MMHG | HEIGHT: 59 IN | TEMPERATURE: 98 F | HEART RATE: 82 BPM | SYSTOLIC BLOOD PRESSURE: 118 MMHG

## 2021-02-27 LAB
ALBUMIN SERPL ELPH-MCNC: 4 G/DL — SIGNIFICANT CHANGE UP (ref 3.3–5)
ALP SERPL-CCNC: 79 U/L — SIGNIFICANT CHANGE UP (ref 40–120)
ALT FLD-CCNC: 24 U/L — SIGNIFICANT CHANGE UP (ref 10–45)
ANION GAP SERPL CALC-SCNC: 11 MMOL/L — SIGNIFICANT CHANGE UP (ref 5–17)
APTT BLD: 32.3 SEC — SIGNIFICANT CHANGE UP (ref 27.5–35.5)
AST SERPL-CCNC: 22 U/L — SIGNIFICANT CHANGE UP (ref 10–40)
BASOPHILS # BLD AUTO: 0.02 K/UL — SIGNIFICANT CHANGE UP (ref 0–0.2)
BASOPHILS NFR BLD AUTO: 0.3 % — SIGNIFICANT CHANGE UP (ref 0–2)
BILIRUB SERPL-MCNC: 0.2 MG/DL — SIGNIFICANT CHANGE UP (ref 0.2–1.2)
BUN SERPL-MCNC: 11 MG/DL — SIGNIFICANT CHANGE UP (ref 7–23)
CALCIUM SERPL-MCNC: 9.3 MG/DL — SIGNIFICANT CHANGE UP (ref 8.4–10.5)
CHLORIDE SERPL-SCNC: 103 MMOL/L — SIGNIFICANT CHANGE UP (ref 96–108)
CO2 SERPL-SCNC: 25 MMOL/L — SIGNIFICANT CHANGE UP (ref 22–31)
CREAT SERPL-MCNC: 0.54 MG/DL — SIGNIFICANT CHANGE UP (ref 0.5–1.3)
EOSINOPHIL # BLD AUTO: 0.08 K/UL — SIGNIFICANT CHANGE UP (ref 0–0.5)
EOSINOPHIL NFR BLD AUTO: 1.1 % — SIGNIFICANT CHANGE UP (ref 0–6)
GLUCOSE SERPL-MCNC: 88 MG/DL — SIGNIFICANT CHANGE UP (ref 70–99)
HCT VFR BLD CALC: 37.4 % — SIGNIFICANT CHANGE UP (ref 34.5–45)
HGB BLD-MCNC: 12.4 G/DL — SIGNIFICANT CHANGE UP (ref 11.5–15.5)
IMM GRANULOCYTES NFR BLD AUTO: 0.3 % — SIGNIFICANT CHANGE UP (ref 0–1.5)
INR BLD: 1.11 RATIO — SIGNIFICANT CHANGE UP (ref 0.88–1.16)
LYMPHOCYTES # BLD AUTO: 1.65 K/UL — SIGNIFICANT CHANGE UP (ref 1–3.3)
LYMPHOCYTES # BLD AUTO: 22.6 % — SIGNIFICANT CHANGE UP (ref 13–44)
MAGNESIUM SERPL-MCNC: 2.4 MG/DL — SIGNIFICANT CHANGE UP (ref 1.6–2.6)
MCHC RBC-ENTMCNC: 30 PG — SIGNIFICANT CHANGE UP (ref 27–34)
MCHC RBC-ENTMCNC: 33.2 GM/DL — SIGNIFICANT CHANGE UP (ref 32–36)
MCV RBC AUTO: 90.6 FL — SIGNIFICANT CHANGE UP (ref 80–100)
MONOCYTES # BLD AUTO: 0.32 K/UL — SIGNIFICANT CHANGE UP (ref 0–0.9)
MONOCYTES NFR BLD AUTO: 4.4 % — SIGNIFICANT CHANGE UP (ref 2–14)
NEUTROPHILS # BLD AUTO: 5.22 K/UL — SIGNIFICANT CHANGE UP (ref 1.8–7.4)
NEUTROPHILS NFR BLD AUTO: 71.3 % — SIGNIFICANT CHANGE UP (ref 43–77)
NRBC # BLD: 0 /100 WBCS — SIGNIFICANT CHANGE UP (ref 0–0)
PHOSPHATE SERPL-MCNC: 2.8 MG/DL — SIGNIFICANT CHANGE UP (ref 2.5–4.5)
PLATELET # BLD AUTO: 350 K/UL — SIGNIFICANT CHANGE UP (ref 150–400)
POTASSIUM SERPL-MCNC: 3.9 MMOL/L — SIGNIFICANT CHANGE UP (ref 3.5–5.3)
POTASSIUM SERPL-SCNC: 3.9 MMOL/L — SIGNIFICANT CHANGE UP (ref 3.5–5.3)
PROT SERPL-MCNC: 7.3 G/DL — SIGNIFICANT CHANGE UP (ref 6–8.3)
PROTHROM AB SERPL-ACNC: 13.3 SEC — SIGNIFICANT CHANGE UP (ref 10.6–13.6)
RBC # BLD: 4.13 M/UL — SIGNIFICANT CHANGE UP (ref 3.8–5.2)
RBC # FLD: 13.7 % — SIGNIFICANT CHANGE UP (ref 10.3–14.5)
SODIUM SERPL-SCNC: 139 MMOL/L — SIGNIFICANT CHANGE UP (ref 135–145)
TROPONIN T, HIGH SENSITIVITY RESULT: 10 NG/L — SIGNIFICANT CHANGE UP (ref 0–51)
TROPONIN T, HIGH SENSITIVITY RESULT: 13 NG/L — SIGNIFICANT CHANGE UP (ref 0–51)
WBC # BLD: 7.31 K/UL — SIGNIFICANT CHANGE UP (ref 3.8–10.5)
WBC # FLD AUTO: 7.31 K/UL — SIGNIFICANT CHANGE UP (ref 3.8–10.5)

## 2021-02-27 PROCEDURE — 71046 X-RAY EXAM CHEST 2 VIEWS: CPT | Mod: 26

## 2021-02-27 PROCEDURE — 85610 PROTHROMBIN TIME: CPT

## 2021-02-27 PROCEDURE — 71046 X-RAY EXAM CHEST 2 VIEWS: CPT

## 2021-02-27 PROCEDURE — 93005 ELECTROCARDIOGRAM TRACING: CPT

## 2021-02-27 PROCEDURE — 83735 ASSAY OF MAGNESIUM: CPT

## 2021-02-27 PROCEDURE — 85730 THROMBOPLASTIN TIME PARTIAL: CPT

## 2021-02-27 PROCEDURE — 84484 ASSAY OF TROPONIN QUANT: CPT

## 2021-02-27 PROCEDURE — 93010 ELECTROCARDIOGRAM REPORT: CPT

## 2021-02-27 PROCEDURE — 85025 COMPLETE CBC W/AUTO DIFF WBC: CPT

## 2021-02-27 PROCEDURE — 85379 FIBRIN DEGRADATION QUANT: CPT

## 2021-02-27 PROCEDURE — 99285 EMERGENCY DEPT VISIT HI MDM: CPT

## 2021-02-27 PROCEDURE — 80053 COMPREHEN METABOLIC PANEL: CPT

## 2021-02-27 PROCEDURE — 84100 ASSAY OF PHOSPHORUS: CPT

## 2021-02-27 PROCEDURE — 96374 THER/PROPH/DIAG INJ IV PUSH: CPT

## 2021-02-27 PROCEDURE — 99284 EMERGENCY DEPT VISIT MOD MDM: CPT | Mod: 25

## 2021-02-27 PROCEDURE — 84702 CHORIONIC GONADOTROPIN TEST: CPT

## 2021-02-27 RX ORDER — FAMOTIDINE 10 MG/ML
20 INJECTION INTRAVENOUS ONCE
Refills: 0 | Status: COMPLETED | OUTPATIENT
Start: 2021-02-27 | End: 2021-02-27

## 2021-02-27 RX ADMIN — FAMOTIDINE 20 MILLIGRAM(S): 10 INJECTION INTRAVENOUS at 16:35

## 2021-02-27 RX ADMIN — Medication 30 MILLILITER(S): at 16:35

## 2021-02-27 NOTE — ED PROVIDER NOTE - PROGRESS NOTE DETAILS
Rogers Palacios, PGY2: Patient seen and evaluated. States pain resolved. Has good PCP and cardiology for follow up. Discussed return precautions and all questions answered. Pt in agreement w/ plan. CAOx3, NAD, VSS. Stable for d/c. Rogers Palacios, PGY2: Patient seen and evaluated. States pain resolved. Has good PCP and cardiology for follow up. Discussed return precautions and all questions answered. Pt in agreement w/ plan. CAOx3, NAD, VSS. Stable for d/c.  Attending Statement: Agree with the above.  No actionable findings on w/u in ED.  No CP recurrence in ED.  Will d/c c strict return precautions and f/u c her cardiologist as above.  --BMM

## 2021-02-27 NOTE — ED ADULT TRIAGE NOTE - CHIEF COMPLAINT QUOTE
3 episodes of chest pain today  sent from urgent care, chest pain resolved while at urgent care  #20 R AC by EMS

## 2021-02-27 NOTE — ED ADULT NURSE NOTE - OBJECTIVE STATEMENT
45y Female with PMH of asthma and celiac disease presents to the ED c/o chest pain. Pt states this morning she felt a sharp pain 3x in the center of her chest. Not currently experiencing any pain. Pt reports she walked to  due to her concerns about the chest pain and was then referred to the ED for further testing. Pt AOx3, spontaneous/unlabored respirations, speaking in full sentences. Placed on cardiac monitor. Pt resting in bed, side rails up, bed in lowest position. Seen and evaluated by MD.

## 2021-02-27 NOTE — ED PROVIDER NOTE - OBJECTIVE STATEMENT
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 Chest pain has resolved since but still feels mild "heartburn" feeling. Denies dyspnea, h/o DVt/PE/stents, leg swelling, OCP use, fever, abdominal pain, N/V/D.

## 2021-02-27 NOTE — ED PROVIDER NOTE - PATIENT PORTAL LINK FT
You can access the FollowMyHealth Patient Portal offered by [facility] by registering at the following website: [email or web address]. By joining [employer]s FollowMyHealth portal, you will also be able to view your health information using other applications (apps) compatible with our system.

## 2021-02-27 NOTE — ED ADULT TRIAGE NOTE - BP NONINVASIVE SYSTOLIC (MM HG)
Problem: Nutrition  Goal: Tolerates feedings  2021 0549 by Grace Rojas RN  Outcome: Outcome Met, Continue evaluating goal progress toward completion  2021 0543 by Grace Rojas RN  Outcome: Outcome Met, Continue evaluating goal progress toward completion  Goal: Consumes sufficient dietary intake without complications  2021 0549 by Grace Rojas RN  Outcome: Outcome Met, Continue evaluating goal progress toward completion  2021 0543 by Grace Rojas RN  Outcome: Outcome Met, Continue evaluating goal progress toward completion  Goal: Achieves catch up weight gain and growth consistent with birth weight percentile  2021 0549 by Grace Rojas RN  Outcome: Outcome Met, Continue evaluating goal progress toward completion  2021 0543 by Grace Rojas RN  Outcome: Outcome Met, Continue evaluating goal progress toward completion     Problem: Breastfeeding  Goal: Breast milk supply is established and maintained to provide breast milk to infant in accordance with mother's preference  2021 0549 by Grace Rojas RN  Outcome: Outcome Met, Continue evaluating goal progress toward completion  2021 0543 by Grace Rojas RN  Outcome: Outcome Met, Continue evaluating goal progress toward completion  Goal: Successful breastfeeding as evidenced by proper latch and adequate suck/ swallow  2021 0549 by Grace Rojas RN  Outcome: Outcome Met, Continue evaluating goal progress toward completion  2021 0543 by Grace Rojas RN  Outcome: Outcome Met, Continue evaluating goal progress toward completion      118

## 2021-02-27 NOTE — ED PROVIDER NOTE - CLINICAL SUMMARY MEDICAL DECISION MAKING FREE TEXT BOX
46yo female TIA on plavix, unknown clotting disorder p/w chest pain and "heartburn". EKg unremarkable, no leg swelling. concern for ACS vs GERD. Moderate risk for PE, unable to use PERC. Dimer, trop, labs, CXR and reassess. If workup (-), has cardiologist to follow up with. 44yo female TIA on plavix, unknown clotting disorder p/w chest pain and "heartburn". EKg unremarkable, no leg swelling. concern for ACS vs GERD. Moderate risk for PE, unable to use PERC. Dimer, trop, labs, CXR and reassess. If workup (-), has cardiologist to follow up with.    Attending Statement: Agree with the above.  CP x 3 episodes today, self limited, occurred ~ 4-5 hours prior to arrival, no cp presently. Nontoxic appearing, vss, PE unremarkable.  Does have h/o TIA and unspecified clotting disorder; will check labs, trop, cxr, reassess.  --BMM

## 2021-02-28 ENCOUNTER — TRANSCRIPTION ENCOUNTER (OUTPATIENT)
Age: 46
End: 2021-02-28

## 2023-04-03 NOTE — ED ADULT NURSE NOTE - CAS TRG GENERAL NORM CIRC DET
Left message for office to call me back regarding patient chair. Strong peripheral pulses/Capillary refill less/equal to 2 seconds

## 2024-02-26 ENCOUNTER — OUTPATIENT (OUTPATIENT)
Dept: OUTPATIENT SERVICES | Facility: HOSPITAL | Age: 49
LOS: 1 days | End: 2024-02-26
Payer: COMMERCIAL

## 2024-02-26 ENCOUNTER — APPOINTMENT (OUTPATIENT)
Dept: ULTRASOUND IMAGING | Facility: CLINIC | Age: 49
End: 2024-02-26
Payer: COMMERCIAL

## 2024-02-26 DIAGNOSIS — Z00.8 ENCOUNTER FOR OTHER GENERAL EXAMINATION: ICD-10-CM

## 2024-02-26 DIAGNOSIS — R10.9 UNSPECIFIED ABDOMINAL PAIN: ICD-10-CM

## 2024-02-26 PROCEDURE — 76700 US EXAM ABDOM COMPLETE: CPT | Mod: 26

## 2024-02-26 PROCEDURE — 76700 US EXAM ABDOM COMPLETE: CPT

## 2024-07-20 ENCOUNTER — EMERGENCY (EMERGENCY)
Facility: HOSPITAL | Age: 49
LOS: 1 days | Discharge: HOME | End: 2024-07-20
Attending: STUDENT IN AN ORGANIZED HEALTH CARE EDUCATION/TRAINING PROGRAM
Payer: COMMERCIAL

## 2024-07-20 VITALS
OXYGEN SATURATION: 97 % | HEART RATE: 74 BPM | DIASTOLIC BLOOD PRESSURE: 70 MMHG | RESPIRATION RATE: 18 BRPM | SYSTOLIC BLOOD PRESSURE: 105 MMHG

## 2024-07-20 VITALS
OXYGEN SATURATION: 100 % | TEMPERATURE: 98 F | DIASTOLIC BLOOD PRESSURE: 69 MMHG | WEIGHT: 145.06 LBS | HEIGHT: 59 IN | SYSTOLIC BLOOD PRESSURE: 129 MMHG | HEART RATE: 98 BPM | RESPIRATION RATE: 20 BRPM

## 2024-07-20 LAB
ALBUMIN SERPL ELPH-MCNC: 4.2 G/DL — SIGNIFICANT CHANGE UP (ref 3.3–5)
ALP SERPL-CCNC: 104 U/L — SIGNIFICANT CHANGE UP (ref 40–120)
ALT FLD-CCNC: 30 U/L — SIGNIFICANT CHANGE UP (ref 10–45)
ANION GAP SERPL CALC-SCNC: 13 MMOL/L — SIGNIFICANT CHANGE UP (ref 5–17)
AST SERPL-CCNC: 31 U/L — SIGNIFICANT CHANGE UP (ref 10–40)
BASOPHILS # BLD AUTO: 0.04 K/UL — SIGNIFICANT CHANGE UP (ref 0–0.2)
BASOPHILS NFR BLD AUTO: 0.4 % — SIGNIFICANT CHANGE UP (ref 0–2)
BILIRUB SERPL-MCNC: 0.2 MG/DL — SIGNIFICANT CHANGE UP (ref 0.2–1.2)
BUN SERPL-MCNC: 11 MG/DL — SIGNIFICANT CHANGE UP (ref 7–23)
CALCIUM SERPL-MCNC: 9.7 MG/DL — SIGNIFICANT CHANGE UP (ref 8.4–10.5)
CHLORIDE SERPL-SCNC: 101 MMOL/L — SIGNIFICANT CHANGE UP (ref 96–108)
CO2 SERPL-SCNC: 24 MMOL/L — SIGNIFICANT CHANGE UP (ref 22–31)
CREAT SERPL-MCNC: 0.55 MG/DL — SIGNIFICANT CHANGE UP (ref 0.5–1.3)
EGFR: 112 ML/MIN/1.73M2 — SIGNIFICANT CHANGE UP
EOSINOPHIL # BLD AUTO: 0.17 K/UL — SIGNIFICANT CHANGE UP (ref 0–0.5)
EOSINOPHIL NFR BLD AUTO: 1.5 % — SIGNIFICANT CHANGE UP (ref 0–6)
GLUCOSE SERPL-MCNC: 150 MG/DL — HIGH (ref 70–99)
HCT VFR BLD CALC: 36 % — SIGNIFICANT CHANGE UP (ref 34.5–45)
HGB BLD-MCNC: 12.5 G/DL — SIGNIFICANT CHANGE UP (ref 11.5–15.5)
IMM GRANULOCYTES NFR BLD AUTO: 0.4 % — SIGNIFICANT CHANGE UP (ref 0–0.9)
LIDOCAIN IGE QN: 19 U/L — SIGNIFICANT CHANGE UP (ref 7–60)
LYMPHOCYTES # BLD AUTO: 2.45 K/UL — SIGNIFICANT CHANGE UP (ref 1–3.3)
LYMPHOCYTES # BLD AUTO: 22.2 % — SIGNIFICANT CHANGE UP (ref 13–44)
MCHC RBC-ENTMCNC: 30.3 PG — SIGNIFICANT CHANGE UP (ref 27–34)
MCHC RBC-ENTMCNC: 34.7 GM/DL — SIGNIFICANT CHANGE UP (ref 32–36)
MCV RBC AUTO: 87.4 FL — SIGNIFICANT CHANGE UP (ref 80–100)
MONOCYTES # BLD AUTO: 0.34 K/UL — SIGNIFICANT CHANGE UP (ref 0–0.9)
MONOCYTES NFR BLD AUTO: 3.1 % — SIGNIFICANT CHANGE UP (ref 2–14)
NEUTROPHILS # BLD AUTO: 8.01 K/UL — HIGH (ref 1.8–7.4)
NEUTROPHILS NFR BLD AUTO: 72.4 % — SIGNIFICANT CHANGE UP (ref 43–77)
NRBC # BLD: 0 /100 WBCS — SIGNIFICANT CHANGE UP (ref 0–0)
PLATELET # BLD AUTO: 323 K/UL — SIGNIFICANT CHANGE UP (ref 150–400)
POTASSIUM SERPL-MCNC: 3.8 MMOL/L — SIGNIFICANT CHANGE UP (ref 3.5–5.3)
POTASSIUM SERPL-SCNC: 3.8 MMOL/L — SIGNIFICANT CHANGE UP (ref 3.5–5.3)
PROT SERPL-MCNC: 7.5 G/DL — SIGNIFICANT CHANGE UP (ref 6–8.3)
RBC # BLD: 4.12 M/UL — SIGNIFICANT CHANGE UP (ref 3.8–5.2)
RBC # FLD: 13.8 % — SIGNIFICANT CHANGE UP (ref 10.3–14.5)
SODIUM SERPL-SCNC: 138 MMOL/L — SIGNIFICANT CHANGE UP (ref 135–145)
WBC # BLD: 11.05 K/UL — HIGH (ref 3.8–10.5)
WBC # FLD AUTO: 11.05 K/UL — HIGH (ref 3.8–10.5)

## 2024-07-20 PROCEDURE — 99284 EMERGENCY DEPT VISIT MOD MDM: CPT | Mod: 25

## 2024-07-20 PROCEDURE — 76705 ECHO EXAM OF ABDOMEN: CPT | Mod: 26

## 2024-07-20 PROCEDURE — 96374 THER/PROPH/DIAG INJ IV PUSH: CPT

## 2024-07-20 PROCEDURE — 76705 ECHO EXAM OF ABDOMEN: CPT

## 2024-07-20 PROCEDURE — 83690 ASSAY OF LIPASE: CPT

## 2024-07-20 PROCEDURE — 99284 EMERGENCY DEPT VISIT MOD MDM: CPT

## 2024-07-20 PROCEDURE — 85025 COMPLETE CBC W/AUTO DIFF WBC: CPT

## 2024-07-20 PROCEDURE — 80053 COMPREHEN METABOLIC PANEL: CPT

## 2024-07-20 RX ORDER — FAMOTIDINE 40 MG
20 TABLET ORAL ONCE
Refills: 0 | Status: COMPLETED | OUTPATIENT
Start: 2024-07-20 | End: 2024-07-20

## 2024-07-20 RX ORDER — DEXTROSE MONOHYDRATE AND SODIUM CHLORIDE 5; .3 G/100ML; G/100ML
1000 INJECTION, SOLUTION INTRAVENOUS ONCE
Refills: 0 | Status: COMPLETED | OUTPATIENT
Start: 2024-07-20 | End: 2024-07-20

## 2024-07-20 RX ORDER — EPINEPHRINE 0.3 MG/.3ML
0.3 INJECTION SUBCUTANEOUS
Qty: 1 | Refills: 0
Start: 2024-07-20 | End: 2024-07-20

## 2024-07-20 RX ADMIN — DEXTROSE MONOHYDRATE AND SODIUM CHLORIDE 1000 MILLILITER(S): 5; .3 INJECTION, SOLUTION INTRAVENOUS at 01:50

## 2024-07-20 RX ADMIN — Medication 20 MILLIGRAM(S): at 01:49

## 2024-07-20 NOTE — ED PROVIDER NOTE - NSICDXPASTSURGICALHX_GEN_ALL_CORE_FT
PAST SURGICAL HISTORY:  Back surgery 1988 [facility]jo [facility]quis for scoliosis 1988    Benign Nevus of Skin (ICD9 216.9)     cholecystectomy 07/06/04

## 2024-07-20 NOTE — ED PROVIDER NOTE - PATIENT PORTAL LINK FT
You can access the FollowMyHealth Patient Portal offered by [facility] by registering at the following website: [email or web address]. By joining [employer]’s FollowMyHealth portal, you will also be able to view your health information using other applications (apps) compatible with our system.

## 2024-07-20 NOTE — ED PROVIDER NOTE - PROGRESS NOTE DETAILS
Attending Greg Ramos:  Patient is hemodynamically stable, feels improved, tolerating PO. Benign abd. No inc wob. No shortness of breath. Feels baseline. All w/u, results discussed at length w/ patient. All questions answered. epipen sent as a refill. Strict return precautions provided w/ verbal understanding expressed. Stable for dc w/ close outpt f/u.

## 2024-07-20 NOTE — ED ADULT NURSE NOTE - NSSEPSISNEWALTERMENTAL_ED_A_ED
Spiritual Care Note    Patient Information     Patient's Name: Francisca Griggs   MRN: [id number]    YOB: 1939   Age and Gender: 80 y.o. female   Unit: Pre-Op   Room (and Bed): 22   Ethnicity or Nationality: white   Primary Language: English   Yarsani/Spiritual Preference: [employer]   Place of Residence: [place], [place]   Medical Diagnosis(-es)/Procedure(s): refractory C diff infection failed 6 week vancomycin taper   Code Status: No Order    Date of Admission: 10/29/2019   Length of Stay: 0 days        Request Information  Nature of the Visit:     Initial, On shift  Continue Visiting:     No  Surgical Visit:      Pre-op  Referral From/Origin of Request:   Verbal patient      Encounter Information  Visited With:      Patient  Observations/Symptoms:    sitting up , alert, pleasant  Interacton/Conversation:    Patient asked me to pray with her before her procedure,  Assessment:      Need  Need: Seeking      Spiritual Assistance and Support  Yarsani Needs:     Prayer  Interventions:      compassionate presence         reflective listening         emotional support         prayer  Outcomes:      Spiritual Comfort        Emotional Release        Progress with Trust  Total Time:      15 minutes  Plan:       No Further Visits      Spiritual Care Provider Information  Title of Spiritual Care Provider:    Name of Spiritual Care Provider:   BERTHA Valero  Phone Number:     [phone]   E-Mail:       [email or web address]     No

## 2024-07-20 NOTE — ED PROVIDER NOTE - NSICDXPASTMEDICALHX_GEN_ALL_CORE_FT
PAST MEDICAL HISTORY:  Anemia (ICD9 285.9)     Asthma (ICD9 493.90) Last asthma episode 10/04/09    Celiac Disease (ICD9 579.0)     PFO (patent foramen ovale)     Scoliosis (ICD9 737.30)     TIA (Transient Ischemic Attack) (ICD9 435.9)     Vitamin D Deficiency (ICD9 268.9)

## 2024-07-20 NOTE — ED ADULT NURSE NOTE - OBJECTIVE STATEMENT
50 yo female PMH ASthma, eosinophilic esophagitis, A&ox3, BIBEMS c/o allergic reaction.  As per pt, had  dinner and around 10pm started having back and abd. pain, took benadryl 50mg PO, but around midnight had to take epipen.  Denies SOB/ swelling/hives.  BReathing even and unlabored, abdomen soft nontender, no pedal edema, abdle to speak in clear sentences. Pt denies chest pain, palpitations, shortness of breath, headache, visual disturbances, numbness/tingling, fever, chills, diaphoresis,  nausea, vomiting, constipation, diarrhea, or urinary symptoms.

## 2024-07-20 NOTE — ED PROVIDER NOTE - CLINICAL SUMMARY MEDICAL DECISION MAKING FREE TEXT BOX
Attending Greg Ramos:  49F here for allergic reaction sxs of spicy diarrhea after eating spicy [place] food, with shortness of breath and lower chest region, bilateral, itching sensation. W/ this, patient also with dull epigastric abd pain and upper/mid back region pain, achy, nonrad. Started around 930p-10p. Took 50mg benadryl w/o sig improvement. Took epipen at 12a because wasn't feeling great and felt some mild improvement. Hx complicated by presumed asthma, though no formal pulm dx, maintained well on singulair. No reported allergies to food, just meds. Denies chest pain, rash, vomiting. Hx complicated by eosinophilic esophagitis, cholecystectomy, hepatic steatosis.    Hemodynamically stable. NAD. no resp distress. AAOx4 (person, place, time, event). PERRL 3mm, EOMI w/o nystagmus, well hydrated, clear oropharynx w/o any edema. RRR, no audible cardiac murmurs. clear lungs, no inc work of breathing. benign abdomen except for mild RUQ ttp,, no peritoneal signs, no cva ttp. no visible rashes nor deformities, no signs of jaundice, fluid overload, nor anemia. symm calves, no edema. full str/rom/neurovasc all 4 extrem. Steady gait.     Hx and exam does not sound classic for anaphylaxis to me. Upper abdominal maybe 2/2 gastropathy, given know eo esophagitis. No inc wob, no e/o resp distress. No rash. + ruq ttp but this is likely 2/2 hepatic steatosis. No cva ttp. W/ clear lungs, doubt acute pulm etiology. No e/o aortic pathology. Plan to eval for  cbd dilatation in the event this is food-related choledocho trigger, Given clear lungs w/o wheeze, do not think cxr will be helpful, lesa given acute onset and e/o PTX. Does not sound cardiac. check labs, RUQ US, supportive care with pepcid for h2 blockage. monitor given recent epi.

## 2024-07-20 NOTE — ED PROVIDER NOTE - NSFOLLOWUPINSTRUCTIONS_ED_ALL_ED_FT
YOU WERE SEEN IN THE ED FOR: allergic reaction symptoms    YOU WERE PRESCRIBED: epipen  FOLLOW THE INSTRUCTIONS ON THE LABEL/CONTAINER    REST, EAT REGULAR HEALTHY MEALS. AVOID YOUR ALLERGENS.    PLEASE FOLLOW UP WITH YOUR PRIVATE PHYSICIAN WITHIN THE NEXT 48 HOURS. BRING COPIES OF YOUR RESULTS.    RETURN TO THE EMERGENCY DEPARTMENT IF YOU EXPERIENCE ANY NEW/CONCERNING/WORSENING SYMPTOMS.

## 2024-07-20 NOTE — ED ADULT TRIAGE NOTE - CHIEF COMPLAINT QUOTE
SOB/difficulty breathing, abdominal pain and diarrhea at 10pm s/p eating soup at 9:30pm.   Given epi pen by family PTA, breathing improved after.   States symptoms feel similar to anaphylactic reactions in the past.

## 2024-07-22 ENCOUNTER — EMERGENCY (EMERGENCY)
Facility: HOSPITAL | Age: 49
LOS: 1 days | Discharge: HOME | End: 2024-07-22
Attending: STUDENT IN AN ORGANIZED HEALTH CARE EDUCATION/TRAINING PROGRAM
Payer: COMMERCIAL

## 2024-07-22 VITALS
TEMPERATURE: 98 F | DIASTOLIC BLOOD PRESSURE: 65 MMHG | OXYGEN SATURATION: 99 % | HEART RATE: 74 BPM | SYSTOLIC BLOOD PRESSURE: 125 MMHG | RESPIRATION RATE: 17 BRPM

## 2024-07-22 VITALS
HEIGHT: 59 IN | RESPIRATION RATE: 18 BRPM | SYSTOLIC BLOOD PRESSURE: 139 MMHG | HEART RATE: 68 BPM | DIASTOLIC BLOOD PRESSURE: 80 MMHG | OXYGEN SATURATION: 100 % | TEMPERATURE: 98 F | WEIGHT: 139.99 LBS

## 2024-07-22 LAB
ALBUMIN SERPL ELPH-MCNC: 4.2 G/DL — SIGNIFICANT CHANGE UP (ref 3.3–5)
ALP SERPL-CCNC: 100 U/L — SIGNIFICANT CHANGE UP (ref 40–120)
ALT FLD-CCNC: 33 U/L — SIGNIFICANT CHANGE UP (ref 10–45)
ANION GAP SERPL CALC-SCNC: 13 MMOL/L — SIGNIFICANT CHANGE UP (ref 5–17)
APPEARANCE UR: ABNORMAL
APTT BLD: 32.1 SEC — SIGNIFICANT CHANGE UP (ref 24.5–35.6)
AST SERPL-CCNC: 38 U/L — SIGNIFICANT CHANGE UP (ref 10–40)
BASOPHILS # BLD AUTO: 0.1 K/UL — SIGNIFICANT CHANGE UP (ref 0–0.2)
BASOPHILS NFR BLD AUTO: 1.7 % — SIGNIFICANT CHANGE UP (ref 0–2)
BILIRUB SERPL-MCNC: 0.4 MG/DL — SIGNIFICANT CHANGE UP (ref 0.2–1.2)
BILIRUB UR-MCNC: NEGATIVE — SIGNIFICANT CHANGE UP
BUN SERPL-MCNC: 10 MG/DL — SIGNIFICANT CHANGE UP (ref 7–23)
CALCIUM SERPL-MCNC: 9.7 MG/DL — SIGNIFICANT CHANGE UP (ref 8.4–10.5)
CHLORIDE SERPL-SCNC: 102 MMOL/L — SIGNIFICANT CHANGE UP (ref 96–108)
CO2 SERPL-SCNC: 24 MMOL/L — SIGNIFICANT CHANGE UP (ref 22–31)
COLOR SPEC: YELLOW — SIGNIFICANT CHANGE UP
CREAT SERPL-MCNC: 0.52 MG/DL — SIGNIFICANT CHANGE UP (ref 0.5–1.3)
DIFF PNL FLD: NEGATIVE — SIGNIFICANT CHANGE UP
EGFR: 113 ML/MIN/1.73M2 — SIGNIFICANT CHANGE UP
EOSINOPHIL # BLD AUTO: 0.1 K/UL — SIGNIFICANT CHANGE UP (ref 0–0.5)
EOSINOPHIL NFR BLD AUTO: 1.7 % — SIGNIFICANT CHANGE UP (ref 0–6)
EPI CELLS # UR: SIGNIFICANT CHANGE UP
GLUCOSE SERPL-MCNC: 108 MG/DL — HIGH (ref 70–99)
GLUCOSE UR QL: NEGATIVE MG/DL — SIGNIFICANT CHANGE UP
HCG SERPL-ACNC: <2 MIU/ML — SIGNIFICANT CHANGE UP
HCT VFR BLD CALC: 38.1 % — SIGNIFICANT CHANGE UP (ref 34.5–45)
HGB BLD-MCNC: 13.2 G/DL — SIGNIFICANT CHANGE UP (ref 11.5–15.5)
INR BLD: 1.01 RATIO — SIGNIFICANT CHANGE UP (ref 0.85–1.18)
KETONES UR-MCNC: NEGATIVE MG/DL — SIGNIFICANT CHANGE UP
LEUKOCYTE ESTERASE UR-ACNC: NEGATIVE — SIGNIFICANT CHANGE UP
LYMPHOCYTES # BLD AUTO: 2.16 K/UL — SIGNIFICANT CHANGE UP (ref 1–3.3)
LYMPHOCYTES # BLD AUTO: 35.7 % — SIGNIFICANT CHANGE UP (ref 13–44)
MANUAL SMEAR VERIFICATION: SIGNIFICANT CHANGE UP
MCHC RBC-ENTMCNC: 30.4 PG — SIGNIFICANT CHANGE UP (ref 27–34)
MCHC RBC-ENTMCNC: 34.6 GM/DL — SIGNIFICANT CHANGE UP (ref 32–36)
MCV RBC AUTO: 87.8 FL — SIGNIFICANT CHANGE UP (ref 80–100)
MONOCYTES # BLD AUTO: 0.42 K/UL — SIGNIFICANT CHANGE UP (ref 0–0.9)
MONOCYTES NFR BLD AUTO: 7 % — SIGNIFICANT CHANGE UP (ref 2–14)
NEUTROPHILS # BLD AUTO: 3.15 K/UL — SIGNIFICANT CHANGE UP (ref 1.8–7.4)
NEUTROPHILS NFR BLD AUTO: 52.2 % — SIGNIFICANT CHANGE UP (ref 43–77)
NITRITE UR-MCNC: NEGATIVE — SIGNIFICANT CHANGE UP
PH UR: 7 — SIGNIFICANT CHANGE UP (ref 5–8)
PLAT MORPH BLD: NORMAL — SIGNIFICANT CHANGE UP
PLATELET # BLD AUTO: 338 K/UL — SIGNIFICANT CHANGE UP (ref 150–400)
POTASSIUM SERPL-MCNC: 3.7 MMOL/L — SIGNIFICANT CHANGE UP (ref 3.5–5.3)
POTASSIUM SERPL-SCNC: 3.7 MMOL/L — SIGNIFICANT CHANGE UP (ref 3.5–5.3)
PROT SERPL-MCNC: 7.6 G/DL — SIGNIFICANT CHANGE UP (ref 6–8.3)
PROT UR-MCNC: SIGNIFICANT CHANGE UP MG/DL
PROTHROM AB SERPL-ACNC: 11.1 SEC — SIGNIFICANT CHANGE UP (ref 9.5–13)
RBC # BLD: 4.34 M/UL — SIGNIFICANT CHANGE UP (ref 3.8–5.2)
RBC # FLD: 14.1 % — SIGNIFICANT CHANGE UP (ref 10.3–14.5)
RBC BLD AUTO: NORMAL — SIGNIFICANT CHANGE UP
RBC CASTS # UR COMP ASSIST: 2 /HPF — SIGNIFICANT CHANGE UP (ref 0–4)
SODIUM SERPL-SCNC: 139 MMOL/L — SIGNIFICANT CHANGE UP (ref 135–145)
SP GR SPEC: 1.01 — SIGNIFICANT CHANGE UP (ref 1–1.03)
TROPONIN T, HIGH SENSITIVITY RESULT: 14 NG/L — SIGNIFICANT CHANGE UP (ref 0–51)
TROPONIN T, HIGH SENSITIVITY RESULT: 16 NG/L — SIGNIFICANT CHANGE UP (ref 0–51)
UROBILINOGEN FLD QL: 0.2 MG/DL — SIGNIFICANT CHANGE UP (ref 0.2–1)
VARIANT LYMPHS # BLD: 1.7 % — SIGNIFICANT CHANGE UP (ref 0–6)
WBC # BLD: 6.04 K/UL — SIGNIFICANT CHANGE UP (ref 3.8–10.5)
WBC # FLD AUTO: 6.04 K/UL — SIGNIFICANT CHANGE UP (ref 3.8–10.5)
WBC UR QL: 1 /HPF — SIGNIFICANT CHANGE UP (ref 0–5)

## 2024-07-22 PROCEDURE — 93005 ELECTROCARDIOGRAM TRACING: CPT

## 2024-07-22 PROCEDURE — 83880 ASSAY OF NATRIURETIC PEPTIDE: CPT

## 2024-07-22 PROCEDURE — 83690 ASSAY OF LIPASE: CPT

## 2024-07-22 PROCEDURE — 96375 TX/PRO/DX INJ NEW DRUG ADDON: CPT

## 2024-07-22 PROCEDURE — 85025 COMPLETE CBC W/AUTO DIFF WBC: CPT

## 2024-07-22 PROCEDURE — 84484 ASSAY OF TROPONIN QUANT: CPT

## 2024-07-22 PROCEDURE — 99285 EMERGENCY DEPT VISIT HI MDM: CPT | Mod: 25

## 2024-07-22 PROCEDURE — 80053 COMPREHEN METABOLIC PANEL: CPT

## 2024-07-22 PROCEDURE — 87077 CULTURE AEROBIC IDENTIFY: CPT

## 2024-07-22 PROCEDURE — 85730 THROMBOPLASTIN TIME PARTIAL: CPT

## 2024-07-22 PROCEDURE — 96374 THER/PROPH/DIAG INJ IV PUSH: CPT

## 2024-07-22 PROCEDURE — 84702 CHORIONIC GONADOTROPIN TEST: CPT

## 2024-07-22 PROCEDURE — 71046 X-RAY EXAM CHEST 2 VIEWS: CPT

## 2024-07-22 PROCEDURE — 85610 PROTHROMBIN TIME: CPT

## 2024-07-22 PROCEDURE — 99284 EMERGENCY DEPT VISIT MOD MDM: CPT

## 2024-07-22 PROCEDURE — 87186 SC STD MICRODIL/AGAR DIL: CPT

## 2024-07-22 PROCEDURE — 87086 URINE CULTURE/COLONY COUNT: CPT

## 2024-07-22 PROCEDURE — 81001 URINALYSIS AUTO W/SCOPE: CPT

## 2024-07-22 PROCEDURE — 71046 X-RAY EXAM CHEST 2 VIEWS: CPT | Mod: 26

## 2024-07-22 PROCEDURE — 83735 ASSAY OF MAGNESIUM: CPT

## 2024-07-22 RX ORDER — FAMOTIDINE 40 MG
20 TABLET ORAL ONCE
Refills: 0 | Status: COMPLETED | OUTPATIENT
Start: 2024-07-22 | End: 2024-07-22

## 2024-07-22 RX ORDER — METHYLPREDNISOLONE ACETATE 20 MG/ML
125 VIAL (ML) INJECTION ONCE
Refills: 0 | Status: COMPLETED | OUTPATIENT
Start: 2024-07-22 | End: 2024-07-22

## 2024-07-22 RX ORDER — SODIUM CHLORIDE 0.9 % (FLUSH) 0.9 %
1000 SYRINGE (ML) INJECTION ONCE
Refills: 0 | Status: COMPLETED | OUTPATIENT
Start: 2024-07-22 | End: 2024-07-22

## 2024-07-22 RX ORDER — ACETAMINOPHEN 325 MG
1000 TABLET ORAL ONCE
Refills: 0 | Status: COMPLETED | OUTPATIENT
Start: 2024-07-22 | End: 2024-07-22

## 2024-07-22 RX ADMIN — Medication 20 MILLIGRAM(S): at 06:30

## 2024-07-22 RX ADMIN — Medication 1000 MILLILITER(S): at 06:30

## 2024-07-22 RX ADMIN — Medication 125 MILLIGRAM(S): at 06:30

## 2024-07-22 RX ADMIN — Medication 400 MILLIGRAM(S): at 06:30

## 2024-07-22 NOTE — ED PROVIDER NOTE - CLINICAL SUMMARY MEDICAL DECISION MAKING FREE TEXT BOX
49 female past medical history asthma, celiac disease, eosinophilic esophagitis, anemia, TIA, PFO presenting w/ chest tightness. DDx includes but not limited to: MSK pain, cardiac w/u, less concerned for allergic reaction but pt insists that she feels like she is having one. Plan: blood work, ECG, CXR, pepcid, solumedrol. Will re-assess. Likely dc. has appt with allergist tomorrow.

## 2024-07-22 NOTE — ED ADULT TRIAGE NOTE - CHIEF COMPLAINT QUOTE
sob and chest tightness  believes it is allergic reaction   does not know what allergen is   took benadryl

## 2024-07-22 NOTE — ED ADULT NURSE NOTE - OBJECTIVE STATEMENT
Pt is 49y F with PMH EOE, asthma, TIA, IBS, celiac disease, complaining of allergic reaction. Pt reports onset of tightness around rib cage and chest, with mild SOB around 0200 this morning. Took 50mg benadryl 0400 prior to coming to [facility]. Was recently evaluated at [facility] for similar symptoms, self-administered epi-pen prior to arrival. Has scheduled appointment tomorrow with immunologist. Upon assessment, A&Ox4, endorses mild SOB, 4/10 rib-cage tightness, generalized abdominal discomfort. Vitals WNL.

## 2024-07-22 NOTE — ED PROVIDER NOTE - ATTENDING CONTRIBUTION TO CARE
Familia Marcano MD (Attending Physician):    I performed a history and physical exam of the patient and discussed their management with the resident/fellow/ACP/student. I have reviewed the resident/fellow/ACP/student note and agree with the documented findings and plan of care, except as noted. I have personally performed a substantive portion of the visit including all aspects of the medical decision making. My medical decision making and observations are found below. Please refer to any progress notes for updates on clinical course.    HPI:  50yo female with past medical history of asthma, celiac disease, eosinophilic esophagitis, anemia, TIA, PFO presenting w/ chest tightness. Patient says that she started having some diffuse abdominal discomfort last night and then she woke up this morning with mid chest tightness that is non-radiating, not pleuritic, not exertional. Pt thinks that she is having an allergic reaction because she felt better after taking 50mg of Benadryl. Pt was seen here 2 days ago for the same symptoms with unremarkable workup including negative right upper quadrant ultrasound. Denies difficulty breathing, difficulty swallowing, throat swelling, rash, tongue/lip/eye swelling. Says her abdominal pain has gotten better since coming to the ED. Had a few episodes of watery diarrhea a couple of days ago (none today). Was straining to have a bowel movement this morning but was unable to have a BM. Also had some dysuria this morning. Denies fever, leg swelling.    PE:  GEN - NAD, well appearing, A&Ox3  HEAD - NC/AT. No facial angioedema.  EYES - PERRL, EOMI  ENT - Airway patent, mucous membranes moist. Uvula midline, not swollen. Tongue not swollen. Floor of mouth soft. Lips not swollen.  PULMONARY - CTA b/l, symmetric breath sounds, no W/R/R  CARDIAC - +S1S2, RRR, no M/G/R, no JVD  CHEST - +B/l chest wall TTP  ABDOMEN - +BS, ND, NT, soft, no guarding, no rebound, no masses, no rigidity   - No CVA TTP b/l  EXTREMITIES - FROM, symmetric pulses, no edema  SKIN - No rash or bruising  NEUROLOGIC - Alert, speech clear, no focal deficits  PSYCH - Normal mood/affect, normal insight    MDM:  DDx includes, but not limited to: ACS, PTX, PNA, costochondritis, allergic reaction, arrythmia, UTI, electrolyte derangement, pancreatitis. ekg, cxr, labs, urine, IVF, tylenol, pepcid, solu-medrol. Dispo pending w/u.

## 2024-07-22 NOTE — ED ADULT NURSE REASSESSMENT NOTE - ED CARDIAC RHYTHM
mechanical AVR on coumadin now on hold in setting of acute leukemia and thrombocytopenia  Seen by cardiology regular

## 2024-07-22 NOTE — ED ADULT NURSE NOTE - NSFALLUNIVINTERV_ED_ALL_ED
Bed/Stretcher in lowest position, wheels locked, appropriate side rails in place/Call bell, personal items and telephone in reach/Instruct patient to call for assistance before getting out of bed/chair/stretcher/Non-slip footwear applied when patient is off stretcher/[place] to call system/Physically safe environment - no spills, clutter or unnecessary equipment/Purposeful proactive rounding/Room/bathroom lighting operational, light cord in reach 31-Mar-2023 10:34 31-Mar-2023 10:36

## 2024-07-22 NOTE — ED ADULT NURSE REASSESSMENT NOTE - TEMPLATE LIST FOR HEAD TO TOE ASSESSMENT
Advise pt I can prescribe another Medrol Dose Pack (please clarify pharmacy). If symptoms continue beyond that I would recommend re-evaluation by Dr. Costa.   Respiratory

## 2024-07-22 NOTE — ED ADULT NURSE REASSESSMENT NOTE - NS ED NURSE REASSESS COMMENT FT1
Report received from RN SANDRA. Pt is aox4, airway clear and patent, lung sounds clear w/ bilateral chest rise, pt speaking in full sentences without difficulty, skin intact, no hives present, pt denies any current discomfort. Report received from RN SANDRA. Pt is aox4, airway clear and patent, lung sounds clear w/ bilateral chest rise, pt speaking in full sentences without difficulty, NSR, skin intact, no hives present, pt denies any current discomfort.

## 2024-07-22 NOTE — ED PROVIDER NOTE - OBJECTIVE STATEMENT
49 female past medical history asthma, celiac disease, eosinophilic esophagitis, anemia, TIA, PFO presenting w/ chest tightness.  Patient says that she started having some abdominal discomfort last night.  And then she woke up this morning with chest tightness.  Thinks that she is having an allergic reaction because she felt better after taking 50 mg of Benadryl.  She was seen here 2 days ago for the same symptoms with unremarkable workup including right upper quadrant ultrasound.  Denies any difficulty breathing, difficulty swallowing, throat swelling, no rash.  Says of the abdominal pain has gotten better.  Had diarrhea couple days ago.  Was straining to have a bowel movement this morning but was unable to.  Also had some dysuria this morning.  No fever.

## 2024-07-22 NOTE — ED ADULT NURSE NOTE - NSICDXPASTSURGICALHX_GEN_ALL_CORE_FT
PAST SURGICAL HISTORY:  Back surgery 1988 [facility]o marquis for scoliosis 1988    Benign Nevus of Skin (ICD9 216.9)     cholecystectomy 07/06/04

## 2024-07-22 NOTE — ED PROVIDER NOTE - PHYSICAL EXAMINATION
PHYSICAL EXAM:  GENERAL: Sitting comfortable in bed, in no acute distress  HENMT: Atraumatic, moist mucous membranes, no oropharyngeal edema, exudates or vesicles, uvula is midline   EYES: Clear bilaterally, PERRL, EOMs intact b/l  HEART: Regular rate and regular rhythm, S1/S2  RESPIRATORY: Clear to auscultation bilaterally, no wheezes/rhonchi/rales  ABDOMEN: Soft, nontender, nondistended  MSK: Anterior chest wall ttp  EXTREMITIES: No lower extremity edema  NEURO: Alert, follows commands, moving all extremities symmetrically  SKIN: Skin normal color for race, warm, dry and intact. No evidence of rash

## 2024-07-22 NOTE — ED PROVIDER NOTE - PATIENT PORTAL LINK FT
You can access the FollowMyHealth Patient Portal offered by [facility] by registering at the following website: [email or web address]. By joining [email or web address]’s [email or web address] portal, you will also be able to view your health information using other applications (apps) compatible with our system.

## 2024-07-22 NOTE — ED PROVIDER NOTE - NSFOLLOWUPINSTRUCTIONS_ED_ALL_ED_FT
An allergic reaction is your body's response to an allergen. Allergens include medicines, food, insect stings, animal dander, mold, latex, chemicals, and dust mites. Pollen from trees, grass, and weeds can also cause an allergic reaction. An allergic reaction can range from mild to severe.    DISCHARGE INSTRUCTIONS:  Call 911 for signs or symptoms of anaphylaxis,  such as trouble breathing, swelling in your mouth or throat, or wheezing. You may also have itching, a rash, hives, or feel like you are going to faint.    Return to the emergency department if:  You have a skin rash, hives, swelling, or itching that is starting to get worse.  Your throat tightens, or your lips or tongue swell.  You have trouble swallowing or speaking.  You have worsening nausea, diarrhea, or abdominal cramps, or you are vomiting.  You have chest pain or tightness.  Contact your healthcare provider if:  You have questions or concerns about your condition or care.

## 2024-07-24 RX ORDER — CEFPODOXIME PROXETIL 50 MG/5 ML
1 SUSPENSION, RECONSTITUTED, ORAL (ML) ORAL
Qty: 14 | Refills: 0
Start: 2024-07-24 | End: 2024-07-30

## 2024-07-25 LAB
-  AMOXICILLIN/CLAVULANIC ACID: SIGNIFICANT CHANGE UP
-  AMPICILLIN/SULBACTAM: SIGNIFICANT CHANGE UP
-  AMPICILLIN: SIGNIFICANT CHANGE UP
-  AZTREONAM: SIGNIFICANT CHANGE UP
-  CEFAZOLIN: SIGNIFICANT CHANGE UP
-  CEFEPIME: SIGNIFICANT CHANGE UP
-  CEFOXITIN: SIGNIFICANT CHANGE UP
-  CEFTRIAXONE: SIGNIFICANT CHANGE UP
-  CEFUROXIME: SIGNIFICANT CHANGE UP
-  CIPROFLOXACIN: SIGNIFICANT CHANGE UP
-  ERTAPENEM: SIGNIFICANT CHANGE UP
-  GENTAMICIN: SIGNIFICANT CHANGE UP
-  LEVOFLOXACIN: SIGNIFICANT CHANGE UP
-  MEROPENEM: SIGNIFICANT CHANGE UP
-  NITROFURANTOIN: SIGNIFICANT CHANGE UP
-  PIPERACILLIN/TAZOBACTAM: SIGNIFICANT CHANGE UP
-  TOBRAMYCIN: SIGNIFICANT CHANGE UP
-  TRIMETHOPRIM/SULFAMETHOXAZOLE: SIGNIFICANT CHANGE UP
CULTURE RESULTS: ABNORMAL
METHOD TYPE: SIGNIFICANT CHANGE UP
ORGANISM # SPEC MICROSCOPIC CNT: ABNORMAL
ORGANISM # SPEC MICROSCOPIC CNT: ABNORMAL
SPECIMEN SOURCE: SIGNIFICANT CHANGE UP

## 2024-08-12 ENCOUNTER — APPOINTMENT (OUTPATIENT)
Dept: INTERNAL MEDICINE | Facility: CLINIC | Age: 49
End: 2024-08-12

## 2024-08-31 NOTE — ED PROVIDER NOTE - PHYSICAL EXAMINATION
Physical Exam:  Gen: NAD, AOx3, non-toxic appearing, able to ambulate without assistance  Head: NCAT  Lung: CTAB, no respiratory distress, no wheezes/rhonchi/rales B/L, speaking in full sentences  CV: RRR, no murmurs, rubs or gallops, distal pulses 2+ b/l  Abd: soft, NT, ND, no guarding, no rigidity, no rebound tenderness, no CVA tenderness   Skin: Warm, well perfused, no rash, no leg swelling  Psych: normal affect, calm
DISPLAY PLAN FREE TEXT

## 2024-09-05 ENCOUNTER — OUTPATIENT (OUTPATIENT)
Dept: OUTPATIENT SERVICES | Facility: HOSPITAL | Age: 49
LOS: 1 days | End: 2024-09-05
Payer: COMMERCIAL

## 2024-09-05 ENCOUNTER — APPOINTMENT (OUTPATIENT)
Dept: CT IMAGING | Facility: IMAGING CENTER | Age: 49
End: 2024-09-05
Payer: COMMERCIAL

## 2024-09-05 DIAGNOSIS — Z00.8 ENCOUNTER FOR OTHER GENERAL EXAMINATION: ICD-10-CM

## 2024-09-05 PROCEDURE — 74176 CT ABD & PELVIS W/O CONTRAST: CPT

## 2024-09-05 PROCEDURE — 74176 CT ABD & PELVIS W/O CONTRAST: CPT | Mod: 26

## 2024-09-29 ENCOUNTER — EMERGENCY (EMERGENCY)
Facility: HOSPITAL | Age: 49
LOS: 1 days | Discharge: HOME | End: 2024-09-29
Attending: EMERGENCY MEDICINE
Payer: COMMERCIAL

## 2024-09-29 VITALS
SYSTOLIC BLOOD PRESSURE: 145 MMHG | HEART RATE: 71 BPM | WEIGHT: 139.99 LBS | DIASTOLIC BLOOD PRESSURE: 84 MMHG | TEMPERATURE: 98 F | RESPIRATION RATE: 20 BRPM | OXYGEN SATURATION: 99 % | HEIGHT: 59 IN

## 2024-09-29 PROCEDURE — 70450 CT HEAD/BRAIN W/O DYE: CPT | Mod: 26,MC

## 2024-09-29 PROCEDURE — 99284 EMERGENCY DEPT VISIT MOD MDM: CPT

## 2024-09-29 PROCEDURE — 99284 EMERGENCY DEPT VISIT MOD MDM: CPT | Mod: 25

## 2024-09-29 PROCEDURE — 70450 CT HEAD/BRAIN W/O DYE: CPT | Mod: MC

## 2024-09-29 RX ORDER — ACETAMINOPHEN 325 MG/1
650 TABLET ORAL ONCE
Refills: 0 | Status: COMPLETED | OUTPATIENT
Start: 2024-09-29 | End: 2024-09-29

## 2024-09-29 RX ADMIN — ACETAMINOPHEN 650 MILLIGRAM(S): 325 TABLET ORAL at 17:12

## 2024-09-29 NOTE — ED PROVIDER NOTE - PHYSICAL EXAMINATION
NAD. VSS. Afebrile. +PERRL, EOMI. No facial or scalp tender. Neck supple. No spinal tender with full ROMs of neck. No chest wall, rib, or cva tender. No seat belt signs. BAD soft, non tender.  Neuro- intact.
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
## 2024-09-29 NOTE — ED PROVIDER NOTE - NSFOLLOWUPINSTRUCTIONS_ED_ALL_ED_FT
Please see the information of Head injury.    Rest.    Keep continue your current medications as prescribed.    Take Tylenol (2 tablets of 500mg every 8hours) as needed for pain.    Follow up with your Dr. for reevaluation, call tomorrow for appointment.    Return for any concerns, fever, chills, vomiting, numbness, weakness, or worsening pain.

## 2024-09-29 NOTE — ED PROVIDER NOTE - PATIENT PORTAL LINK FT
You can access the FollowMyHealth Patient Portal offered by [facility] by registering at the following website: [email or web address]. By joining [employer]’s FollowMyHealth portal, you will also be able to view your health information using other applications (apps) compatible with our system.

## 2024-09-29 NOTE — ED PROVIDER NOTE - CPE EDP GASTRO NORM
No care due was identified.  Health [facility] Embedded Care Due Messages. Reference number: [id number].   7/23/2024 12:20:04 AM CDT   normal...
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
## 2025-01-19 ENCOUNTER — EMERGENCY (EMERGENCY)
Facility: HOSPITAL | Age: 50
LOS: 1 days | Discharge: HOME | End: 2025-01-19
Attending: STUDENT IN AN ORGANIZED HEALTH CARE EDUCATION/TRAINING PROGRAM
Payer: COMMERCIAL

## 2025-01-19 VITALS
TEMPERATURE: 98 F | SYSTOLIC BLOOD PRESSURE: 106 MMHG | DIASTOLIC BLOOD PRESSURE: 70 MMHG | RESPIRATION RATE: 16 BRPM | OXYGEN SATURATION: 98 % | HEART RATE: 73 BPM

## 2025-01-19 VITALS
WEIGHT: 130.07 LBS | OXYGEN SATURATION: 96 % | SYSTOLIC BLOOD PRESSURE: 145 MMHG | HEIGHT: 59 IN | TEMPERATURE: 98 F | DIASTOLIC BLOOD PRESSURE: 83 MMHG | RESPIRATION RATE: 18 BRPM | HEART RATE: 95 BPM

## 2025-01-19 PROCEDURE — 94640 AIRWAY INHALATION TREATMENT: CPT

## 2025-01-19 PROCEDURE — 99284 EMERGENCY DEPT VISIT MOD MDM: CPT

## 2025-01-19 PROCEDURE — 93005 ELECTROCARDIOGRAM TRACING: CPT

## 2025-01-19 PROCEDURE — 99284 EMERGENCY DEPT VISIT MOD MDM: CPT | Mod: 25

## 2025-01-19 RX ORDER — DEXAMETHASONE SODIUM PHOSPHATE 4 MG/ML
6 VIAL (ML) INJECTION ONCE
Refills: 0 | Status: COMPLETED | OUTPATIENT
Start: 2025-01-19 | End: 2025-01-19

## 2025-01-19 RX ORDER — LEVALBUTEROL 1.25 MG/3ML
0.63 SOLUTION RESPIRATORY (INHALATION) ONCE
Refills: 0 | Status: COMPLETED | OUTPATIENT
Start: 2025-01-19 | End: 2025-01-19

## 2025-01-19 RX ADMIN — Medication 6 MILLIGRAM(S): at 07:21

## 2025-01-19 RX ADMIN — LEVALBUTEROL 0.63 MILLIGRAM(S): 1.25 SOLUTION RESPIRATORY (INHALATION) at 08:27

## 2025-01-19 NOTE — ED ADULT NURSE NOTE - OBJECTIVE STATEMENT
Pt is a 48yo F w/ PMH FER, asthma, food allergies presents to ED c/o SOB. 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 I have FER so I use CPAP at night, I did two levalbuterol treatments (12AM and 3AM) and took 40mg PO prednisone as per the on call doctor I contacted, but nothing helped. I felt very tight still, and sleeping was uncomfortable." VSS, breathing unlabored, pt speaking in full, coherent sentences, no dyspnea observed. Pt 100% on RA. Denies N/V/D, blood in stools, hematuria, dysuria, urinary frequency, fevers/chills, sick contacts. A&Ox3. Strong peripheral pulses. Skin warm dry intact and normal for ethnicity. Ambulatory with steady gait in ED. Stretcher locked and in lowest position, appropriate side rails up. Pt instructed to notify RN if assistance is needed.

## 2025-01-19 NOTE — ED PROVIDER NOTE - PHYSICAL EXAMINATION
Attending Dr. Mcgill: Nontoxic-appearing, poor air entry bilaterally, no increased work of breathing or respiratory distress but does appear to become breathless at the end of long sentences, moist mucous membranes, normal heart sounds, abdomen soft/nontender, no lip or tongue swelling, no rash.

## 2025-01-19 NOTE — ED PROVIDER NOTE - PATIENT PORTAL LINK FT
You can access the FollowMyHealth Patient Portal offered by [facility] by registering at the following website: [email or web address]. By joining [employer]’s FollowMyHealth portal, you will also be able to view your health information using other applications (apps) compatible with our system.

## 2025-01-19 NOTE — ED PROVIDER NOTE - OBJECTIVE STATEMENT
Attending Dr. Mcgill: 49-year-old female with history of asthma and recent diagnosis of multiple food allergies (gluten, tree nuts, shellfish) presenting with shortness of breath starting at 2 AM.  Patient reports eating a meal at around 6 PM and felt fine but she awoke with acute onset shortness of breath and feeling of chest tightness.  She has prednisone at home and took an oral dose of 40 mg and used her leave albuterol inhaler x 1 at 3 AM without improvement.  She took all of her antihistamines as well.  Denies fevers, chills, nausea, vomiting.

## 2025-01-19 NOTE — ED ADULT NURSE REASSESSMENT NOTE - NS ED NURSE REASSESS COMMENT FT1
Patient A&Ox4, awake and alert. Patient awaiting medications from pharmacy and reassessment. Patient breathing spontaneously and unlabored.

## 2025-01-19 NOTE — ED PROVIDER NOTE - ATTENDING CONTRIBUTION TO CARE
I, Dr. Viky Mcgill, have personally performed a face to face medical and diagnostic evaluation of the patient. I have discussed with and reviewed the Resident's and/or ACP's and/or Medical/PA/NP student's note and agree with the History, ROS, Physical Exam and MDM unless otherwise indicated. A brief summary of my personal evaluation and impression can be found below.    MDM: 49-year-old female with history of asthma and food allergies presenting with shortness of breath while at rest.  Concerned that there could have been some cross-contamination in her food but was not exposed to a known allergen.  No additional anaphylactic symptoms.  Takes leave albuterol instead of albuterol due to palpitations.  No infectious symptoms to raise suspicion for pneumonia.  Plan for neb and steroid and reassess.

## 2025-01-19 NOTE — ED PROVIDER NOTE - PROGRESS NOTE DETAILS
To PGY3: Pt was reassessed and is doing well. Lungs clear to auscultation bilaterally. Results, including any incidental findings, were discussed. Follow up and return precautions were discussed. Patient verbalized understanding To PGY3: Pt was reassessed and is doing well. Lungs clear to auscultation bilaterally. Results, including any incidental findings, were discussed. Follow up and return precautions were discussed. Patient already has appointment scheduled with her pulmonologist on Friday to discuss starting biologics. Patient verbalized understanding

## 2025-01-19 NOTE — ED PROVIDER NOTE - NSFOLLOWUPINSTRUCTIONS_ED_ALL_ED_FT
Asthma, Adult  Asthma is a long-term (chronic) condition that causes recurrent episodes in which the lower airways in the lungs become tight and narrow. The narrowing is caused by inflammation and tightening of the smooth muscle around the lower airways.    Asthma episodes, also called asthma attacks or asthma flares, may cause coughing, making high-pitched whistling sounds when you breathe, most often when you breathe out (wheezing), shortness of breath, and chest pain. The airways may produce extra mucus caused by the inflammation and irritation. During an attack, it can be difficult to breathe. Asthma attacks can range from minor to life-threatening.    Asthma cannot be cured, but medicines and lifestyle changes can help control it and treat acute attacks. It is important to keep your asthma well controlled so the condition does not interfere with your daily life.    What are the causes?  This condition is believed to be caused by inherited (genetic) and environmental factors, but its exact cause is not known.    What can trigger an asthma attack?    Many things can bring on an asthma attack or make symptoms worse. These triggers are different for every person. Common triggers include:  Allergens and irritants like mold, dust, pet dander, cockroaches, pollen, air pollution, and chemical odors.  Cigarette smoke.  Weather changes and cold air.  Stress and strong emotional responses such as crying or laughing hard.  Certain medications such as aspirin or beta blockers.  Infections and inflammatory conditions, such as the flu, a cold, pneumonia, or inflammation of the nasal membranes (rhinitis).  Gastroesophageal reflux disease (GERD).  What are the signs or symptoms?  Symptoms may occur right after exposure to an asthma trigger or hours later and can vary by person. Common signs and symptoms include:  Wheezing.  Trouble breathing (shortness of breath).  Excessive nighttime or early morning coughing.  Chest tightness.  Tiredness (fatigue) with minimal activity.  Difficulty talking in complete sentences.  Poor exercise tolerance.  How is this diagnosed?  This condition is diagnosed based on:  A physical exam and your medical history.  Tests, which may include:  Lung function studies to evaluate the flow of air in your lungs.  Allergy tests.  Imaging tests, such as X-rays.  How is this treated?  There is no cure, but symptoms can be controlled with proper treatment. Treatment usually involves:  Identifying and avoiding your asthma triggers.  Inhaled medicines. Two types are commonly used to treat asthma, depending on severity:  Controller medicines. These help prevent asthma symptoms from occurring. They are taken every day.  Fast-acting reliever or rescue medicines. These quickly relieve asthma symptoms. They are used as needed and provide short-term relief.  Using other medicines, such as:  Allergy medicines, such as antihistamines, if your asthma attacks are triggered by allergens.  Immune medicines (immunomodulators). These are medicines that help control the immune system.  Using supplemental oxygen. This is only needed during a severe episode.  Creating an asthma action plan. An asthma action plan is a written plan for managing and treating your asthma attacks. This plan includes:  A list of your asthma triggers and how to avoid them.  Information about when medicines should be taken and when their dosage should be changed.  Instructions about using a device called a peak flow meter. A peak flow meter measures how well the lungs are working and the severity of your asthma. It helps you monitor your condition.  Follow these instructions at home:  Take over-the-counter and prescription medicines only as told by your health care provider.  Stay up to date on all vaccinations as recommended by your healthcare provider, including vaccines for the flu and pneumonia.  Use a peak flow meter and keep track of your peak flow readings.  Understand and use your asthma action plan to address any asthma flares.  Do not smoke or allow anyone to smoke in your home.  Contact a health care provider if:  You have wheezing, shortness of breath, or a cough that is not responding to medicines.  Your medicines are causing side effects, such as a rash, itching, swelling, or trouble breathing.  You need to use a reliever medicine more than 2–3 times a week.  Your peak flow reading is still at 50–79% of your personal best after following your action plan for 1 hour.  You have a fever and shortness of breath.  Get help right away if:  You are getting worse and do not respond to treatment during an asthma attack.  You are short of breath when at rest or when doing very little physical activity.  You have difficulty eating, drinking, or talking.  You have chest pain or tightness.  You develop a fast heartbeat or palpitations.  You have a bluish color to your lips or fingernails.  You are light-headed or dizzy, or you faint.  Your peak flow reading is less than 50% of your personal best.  You feel too tired to breathe normally.  These symptoms may be an emergency. Get help right away. Call 911.  Do not wait to see if the symptoms will go away.  Do not drive yourself to the hospital.  Summary  Asthma is a long-term (chronic) condition that causes recurrent episodes in which the airways become tight and narrow. Asthma episodes, also called asthma attacks or asthma flares, can cause coughing, wheezing, shortness of breath, and chest pain.  Asthma cannot be cured, but medicines and lifestyle changes can help keep it well controlled and prevent asthma flares.  Make sure you understand how to avoid triggers and how and when to use your medicines.  Asthma attacks can range from minor to life-threatening. Get help right away if you have an asthma attack and do not respond to treatment with your usual rescue medicines.

## 2025-02-18 ENCOUNTER — EMERGENCY (EMERGENCY)
Facility: HOSPITAL | Age: 50
LOS: 1 days | Discharge: HOME | End: 2025-02-18
Attending: EMERGENCY MEDICINE
Payer: COMMERCIAL

## 2025-02-18 VITALS
HEIGHT: 59 IN | DIASTOLIC BLOOD PRESSURE: 83 MMHG | TEMPERATURE: 98 F | RESPIRATION RATE: 114 BRPM | WEIGHT: 130.07 LBS | OXYGEN SATURATION: 99 % | HEART RATE: 114 BPM | SYSTOLIC BLOOD PRESSURE: 137 MMHG

## 2025-02-18 LAB
ALBUMIN SERPL ELPH-MCNC: 3.8 G/DL — SIGNIFICANT CHANGE UP (ref 3.3–5)
ALP SERPL-CCNC: 114 U/L — SIGNIFICANT CHANGE UP (ref 40–120)
ALT FLD-CCNC: 32 U/L — SIGNIFICANT CHANGE UP (ref 10–45)
ANION GAP SERPL CALC-SCNC: 12 MMOL/L — SIGNIFICANT CHANGE UP (ref 5–17)
AST SERPL-CCNC: 33 U/L — SIGNIFICANT CHANGE UP (ref 10–40)
BASOPHILS # BLD AUTO: 0.06 K/UL — SIGNIFICANT CHANGE UP (ref 0–0.2)
BASOPHILS NFR BLD AUTO: 0.4 % — SIGNIFICANT CHANGE UP (ref 0–2)
BILIRUB SERPL-MCNC: 0.2 MG/DL — SIGNIFICANT CHANGE UP (ref 0.2–1.2)
BUN SERPL-MCNC: 11 MG/DL — SIGNIFICANT CHANGE UP (ref 7–23)
CALCIUM SERPL-MCNC: 9.3 MG/DL — SIGNIFICANT CHANGE UP (ref 8.4–10.5)
CHLORIDE SERPL-SCNC: 102 MMOL/L — SIGNIFICANT CHANGE UP (ref 96–108)
CO2 SERPL-SCNC: 24 MMOL/L — SIGNIFICANT CHANGE UP (ref 22–31)
CREAT SERPL-MCNC: 0.65 MG/DL — SIGNIFICANT CHANGE UP (ref 0.5–1.3)
EGFR: 107 ML/MIN/1.73M2 — SIGNIFICANT CHANGE UP
EOSINOPHIL # BLD AUTO: 0.06 K/UL — SIGNIFICANT CHANGE UP (ref 0–0.5)
EOSINOPHIL NFR BLD AUTO: 0.4 % — SIGNIFICANT CHANGE UP (ref 0–6)
GLUCOSE SERPL-MCNC: 128 MG/DL — HIGH (ref 70–99)
HCT VFR BLD CALC: 41.2 % — SIGNIFICANT CHANGE UP (ref 34.5–45)
HGB BLD-MCNC: 13.6 G/DL — SIGNIFICANT CHANGE UP (ref 11.5–15.5)
IMM GRANULOCYTES NFR BLD AUTO: 0.4 % — SIGNIFICANT CHANGE UP (ref 0–0.9)
LYMPHOCYTES # BLD AUTO: 1 K/UL — SIGNIFICANT CHANGE UP (ref 1–3.3)
LYMPHOCYTES # BLD AUTO: 7.4 % — LOW (ref 13–44)
MCHC RBC-ENTMCNC: 30.4 PG — SIGNIFICANT CHANGE UP (ref 27–34)
MCHC RBC-ENTMCNC: 33 G/DL — SIGNIFICANT CHANGE UP (ref 32–36)
MCV RBC AUTO: 92 FL — SIGNIFICANT CHANGE UP (ref 80–100)
MONOCYTES # BLD AUTO: 0.34 K/UL — SIGNIFICANT CHANGE UP (ref 0–0.9)
MONOCYTES NFR BLD AUTO: 2.5 % — SIGNIFICANT CHANGE UP (ref 2–14)
NEUTROPHILS # BLD AUTO: 12 K/UL — HIGH (ref 1.8–7.4)
NEUTROPHILS NFR BLD AUTO: 88.9 % — HIGH (ref 43–77)
NRBC BLD AUTO-RTO: 0 /100 WBCS — SIGNIFICANT CHANGE UP (ref 0–0)
NT-PROBNP SERPL-SCNC: 73 PG/ML — SIGNIFICANT CHANGE UP (ref 0–300)
PLATELET # BLD AUTO: 354 K/UL — SIGNIFICANT CHANGE UP (ref 150–400)
POTASSIUM SERPL-MCNC: 3.8 MMOL/L — SIGNIFICANT CHANGE UP (ref 3.5–5.3)
POTASSIUM SERPL-SCNC: 3.8 MMOL/L — SIGNIFICANT CHANGE UP (ref 3.5–5.3)
PROT SERPL-MCNC: 7 G/DL — SIGNIFICANT CHANGE UP (ref 6–8.3)
RBC # BLD: 4.48 M/UL — SIGNIFICANT CHANGE UP (ref 3.8–5.2)
RBC # FLD: 13.2 % — SIGNIFICANT CHANGE UP (ref 10.3–14.5)
SODIUM SERPL-SCNC: 138 MMOL/L — SIGNIFICANT CHANGE UP (ref 135–145)
TROPONIN T, HIGH SENSITIVITY RESULT: 9 NG/L — SIGNIFICANT CHANGE UP (ref 0–51)
WBC # BLD: 13.51 K/UL — HIGH (ref 3.8–10.5)
WBC # FLD AUTO: 13.51 K/UL — HIGH (ref 3.8–10.5)

## 2025-02-18 PROCEDURE — 71045 X-RAY EXAM CHEST 1 VIEW: CPT | Mod: 26

## 2025-02-18 PROCEDURE — 99285 EMERGENCY DEPT VISIT HI MDM: CPT

## 2025-02-18 RX ORDER — IPRATROPIUM BROMIDE AND ALBUTEROL SULFATE .5; 2.5 MG/3ML; MG/3ML
3 SOLUTION RESPIRATORY (INHALATION) ONCE
Refills: 0 | Status: COMPLETED | OUTPATIENT
Start: 2025-02-18 | End: 2025-02-18

## 2025-02-18 RX ORDER — FAMOTIDINE 10 MG/ML
40 INJECTION INTRAVENOUS ONCE
Refills: 0 | Status: COMPLETED | OUTPATIENT
Start: 2025-02-18 | End: 2025-02-18

## 2025-02-18 RX ADMIN — FAMOTIDINE 40 MILLIGRAM(S): 10 INJECTION INTRAVENOUS at 23:38

## 2025-02-18 RX ADMIN — Medication 0.63 MILLIGRAM(S): at 23:38

## 2025-02-18 NOTE — ED ADULT NURSE REASSESSMENT NOTE - NS ED NURSE REASSESS COMMENT FT1
Report received from NILTON Limon. Pt received A&Ox4, vitals retaken and documented. Bed locked and in lowest position, side rails raised, call bell within reach. Currently pending levalbuterol medication, pt to be discharged.
ED RN contacted ED Pharamcy for levalbuterol at [phone]

## 2025-02-18 NOTE — ED ADULT TRIAGE NOTE - CHIEF COMPLAINT QUOTE
difficulty breathing & chest tightness, took albuterol & prednisone  hx of asthma,, well appearing on triage  denies fevers

## 2025-02-18 NOTE — ED PROVIDER NOTE - NSFOLLOWUPINSTRUCTIONS_ED_ALL_ED_FT
1. It is important to follow up with your primary care doctor in 1-2 days    follow up with your pulmonologist. call the office tomorrow morning.     2. bring a copy of all your results to your follow up appointments    3.  medication from the pharmacy and take as instructed     4. if your symptoms worsen, persist, or if any new symptoms develop, or if you experience any signs of distress, return to the ER right away.

## 2025-02-18 NOTE — ED PROVIDER NOTE - CLINICAL SUMMARY MEDICAL DECISION MAKING FREE TEXT BOX
Jaylene Liang MD - Attending Physician: Pt here with episode of sudden shortness of breath. Has severe asthma, prior to arrival took albuterol neb and inhaler as well as steroids. Arrives well appearing, no wheezing, no tachypnea, no increased work of breathing. Patient very concerned for pna or other cause of diff breathing. Still reports tightness. Labs, XR, Nebs as needed. Likely dc home

## 2025-02-18 NOTE — ED PROVIDER NOTE - PROGRESS NOTE DETAILS
Lisbeth Garcia PA-C: patient declined third round of albuterol. spoke with patient pulmonologist. recommend giving patient another 30mg of prednisone PO and discharging patient on 60mg of prednisone for 4 days. advised for patient to call office this morning. patient present for conversation with pulmonologist. all results reviewed and discussed with patient. in agreement with plan. stable for discharge. discussed with ED attending

## 2025-02-18 NOTE — ED PROVIDER NOTE - PATIENT PORTAL LINK FT
You can access the FollowMyHealth Patient Portal offered by [facility] by registering at the following website: [email or web address]. By joining [employer]’s FollowMyHealth portal, you will also be able to view your health information using other applications (apps) compatible with our system.

## 2025-02-18 NOTE — ED ADULT NURSE NOTE - OBJECTIVE STATEMENT
50 yo F hx of TIA, PFO, Ciliacs, Asthma p/w SOB tonight pt took Albuterol, prednisone and fluticasone and endorses improvement but is still feeling some tightness in her chest. pt has never been intubated or hospitalized for Asthma.  pt is A&Ox4, MAEW, breathing comfortably, abd soft non tender, skin warm dry intact/normal for race.  Pt denies headache, dizziness, chest pain, palpitations, cough, SOB, abdominal pain, n/v/d, urinary symptoms, fevers, chills, weakness at this time.

## 2025-02-18 NOTE — ED PROVIDER NOTE - OBJECTIVE STATEMENT
48 y/o female, hx of asthma, denies intubation, has been to the ER for asthma before, celiac disease, multiple food allergies, presents to the ER for SOB and chest tightness. states she is allergic to fruit. states shes was around fruits tonight and believes that triggered her asthma. states she began to experience chest tightness and SOB typical of her asthma. states took albuterol, nebulizer, 30mg of prednisone. states symptoms improved but still has some chest tightness. denies f/n/v/d, HA, dizziness, abdominal pain, urinary symptoms, LOC, leg pain/swelling.

## 2025-02-19 VITALS
OXYGEN SATURATION: 97 % | TEMPERATURE: 98 F | RESPIRATION RATE: 19 BRPM | HEART RATE: 114 BPM | SYSTOLIC BLOOD PRESSURE: 135 MMHG | DIASTOLIC BLOOD PRESSURE: 81 MMHG

## 2025-02-19 PROCEDURE — 99285 EMERGENCY DEPT VISIT HI MDM: CPT | Mod: 25

## 2025-02-19 PROCEDURE — 93005 ELECTROCARDIOGRAM TRACING: CPT

## 2025-02-19 PROCEDURE — 85025 COMPLETE CBC W/AUTO DIFF WBC: CPT

## 2025-02-19 PROCEDURE — 83880 ASSAY OF NATRIURETIC PEPTIDE: CPT

## 2025-02-19 PROCEDURE — 71045 X-RAY EXAM CHEST 1 VIEW: CPT

## 2025-02-19 PROCEDURE — 94640 AIRWAY INHALATION TREATMENT: CPT

## 2025-02-19 PROCEDURE — 84484 ASSAY OF TROPONIN QUANT: CPT

## 2025-02-19 PROCEDURE — 80053 COMPREHEN METABOLIC PANEL: CPT

## 2025-02-19 RX ORDER — PREDNISONE 5 MG/1
3 TABLET ORAL
Qty: 12 | Refills: 0
Start: 2025-02-19 | End: 2025-02-22

## 2025-02-19 RX ORDER — PREDNISONE 5 MG/1
30 TABLET ORAL ONCE
Refills: 0 | Status: COMPLETED | OUTPATIENT
Start: 2025-02-19 | End: 2025-02-19

## 2025-02-19 RX ADMIN — PREDNISONE 30 MILLIGRAM(S): 5 TABLET ORAL at 02:22

## 2025-02-19 RX ADMIN — Medication 0.63 MILLIGRAM(S): at 00:09

## 2025-05-07 ENCOUNTER — NON-APPOINTMENT (OUTPATIENT)
Age: 50
End: 2025-05-07

## 2025-05-08 ENCOUNTER — APPOINTMENT (OUTPATIENT)
Dept: ENDOCRINOLOGY | Facility: CLINIC | Age: 50
End: 2025-05-08

## 2025-05-08 VITALS
TEMPERATURE: 98 F | WEIGHT: 135.38 LBS | HEART RATE: 79 BPM | BODY MASS INDEX: 27.29 KG/M2 | SYSTOLIC BLOOD PRESSURE: 110 MMHG | HEIGHT: 59 IN | DIASTOLIC BLOOD PRESSURE: 60 MMHG | OXYGEN SATURATION: 97 %

## 2025-05-08 DIAGNOSIS — K21.9 GASTRO-ESOPHAGEAL REFLUX DISEASE W/OUT ESOPHAGITIS: ICD-10-CM

## 2025-05-08 DIAGNOSIS — Z91.018 ALLERGY TO OTHER FOODS: ICD-10-CM

## 2025-05-08 DIAGNOSIS — M85.859 OTHER SPECIFIED DISORDERS OF BONE DENSITY AND STRUCTURE, UNSPECIFIED THIGH: ICD-10-CM

## 2025-05-08 DIAGNOSIS — K90.0 CELIAC DISEASE: ICD-10-CM

## 2025-05-08 PROCEDURE — 99204 OFFICE O/P NEW MOD 45 MIN: CPT

## 2025-05-08 PROCEDURE — 36415 COLL VENOUS BLD VENIPUNCTURE: CPT

## 2025-05-09 LAB
25(OH)D3 SERPL-MCNC: 101 NG/ML
ALBUMIN SERPL ELPH-MCNC: 4.1 G/DL
ALP BLD-CCNC: 114 U/L
ALT SERPL-CCNC: 25 U/L
ANION GAP SERPL CALC-SCNC: 14 MMOL/L
AST SERPL-CCNC: 29 U/L
BILIRUB SERPL-MCNC: 0.2 MG/DL
BUN SERPL-MCNC: 8 MG/DL
CALCIUM SERPL-MCNC: 9.8 MG/DL
CALCIUM SERPL-MCNC: 9.8 MG/DL
CHLORIDE SERPL-SCNC: 102 MMOL/L
CO2 SERPL-SCNC: 26 MMOL/L
CREAT SERPL-MCNC: 0.63 MG/DL
EGFRCR SERPLBLD CKD-EPI 2021: 107 ML/MIN/1.73M2
ESTIMATED AVERAGE GLUCOSE: 108 MG/DL
GLUCOSE SERPL-MCNC: 87 MG/DL
HBA1C MFR BLD HPLC: 5.4 %
MAGNESIUM SERPL-MCNC: 2.4 MG/DL
PARATHYROID HORMONE INTACT: 39 PG/ML
PHOSPHATE SERPL-MCNC: 3.8 MG/DL
POTASSIUM SERPL-SCNC: 4.6 MMOL/L
PROT SERPL-MCNC: 6.9 G/DL
SODIUM SERPL-SCNC: 142 MMOL/L
T3FREE SERPL-MCNC: 2.7 PG/ML
T4 FREE SERPL-MCNC: 1.3 NG/DL
THYROGLOB AB SERPL-ACNC: 15.2 IU/ML
THYROPEROXIDASE AB SERPL IA-ACNC: 11.7 IU/ML
TSH SERPL-ACNC: 1.89 UIU/ML
VIT B12 SERPL-MCNC: 769 PG/ML

## 2025-05-13 LAB
ALP BONE SERPL-MCNC: 18.8 UG/L
OSTEOCALCIN SERPL-MCNC: 23.2 NG/ML
VIT A SERPL-MCNC: 38.2 UG/DL
VIT B1 SERPL-MCNC: 169.1 NMOL/L

## 2025-05-14 LAB — VIT B2 SERPL-MCNC: 297 UG/L
